# Patient Record
Sex: FEMALE | Race: OTHER | NOT HISPANIC OR LATINO | URBAN - METROPOLITAN AREA
[De-identification: names, ages, dates, MRNs, and addresses within clinical notes are randomized per-mention and may not be internally consistent; named-entity substitution may affect disease eponyms.]

---

## 2023-05-10 ENCOUNTER — INPATIENT (INPATIENT)
Facility: HOSPITAL | Age: 88
LOS: 5 days | Discharge: HOME | DRG: 310 | End: 2023-05-16
Attending: HOSPITALIST | Admitting: HOSPITALIST
Payer: MEDICAID

## 2023-05-10 VITALS
HEART RATE: 122 BPM | SYSTOLIC BLOOD PRESSURE: 137 MMHG | DIASTOLIC BLOOD PRESSURE: 95 MMHG | TEMPERATURE: 98 F | RESPIRATION RATE: 20 BRPM | WEIGHT: 160.06 LBS | OXYGEN SATURATION: 96 %

## 2023-05-10 DIAGNOSIS — E03.9 HYPOTHYROIDISM, UNSPECIFIED: ICD-10-CM

## 2023-05-10 DIAGNOSIS — F41.9 ANXIETY DISORDER, UNSPECIFIED: ICD-10-CM

## 2023-05-10 DIAGNOSIS — I48.91 UNSPECIFIED ATRIAL FIBRILLATION: ICD-10-CM

## 2023-05-10 DIAGNOSIS — Z29.9 ENCOUNTER FOR PROPHYLACTIC MEASURES, UNSPECIFIED: ICD-10-CM

## 2023-05-10 DIAGNOSIS — Z96.653 PRESENCE OF ARTIFICIAL KNEE JOINT, BILATERAL: Chronic | ICD-10-CM

## 2023-05-10 DIAGNOSIS — I50.9 HEART FAILURE, UNSPECIFIED: ICD-10-CM

## 2023-05-10 DIAGNOSIS — I87.2 VENOUS INSUFFICIENCY (CHRONIC) (PERIPHERAL): ICD-10-CM

## 2023-05-10 LAB
ALBUMIN SERPL ELPH-MCNC: 3.2 G/DL — LOW (ref 3.5–5)
ALP SERPL-CCNC: 90 U/L — SIGNIFICANT CHANGE UP (ref 40–120)
ALT FLD-CCNC: 23 U/L DA — SIGNIFICANT CHANGE UP (ref 10–60)
ANION GAP SERPL CALC-SCNC: 2 MMOL/L — LOW (ref 5–17)
APPEARANCE UR: CLEAR — SIGNIFICANT CHANGE UP
AST SERPL-CCNC: 20 U/L — SIGNIFICANT CHANGE UP (ref 10–40)
BASOPHILS # BLD AUTO: 0.06 K/UL — SIGNIFICANT CHANGE UP (ref 0–0.2)
BASOPHILS NFR BLD AUTO: 0.9 % — SIGNIFICANT CHANGE UP (ref 0–2)
BILIRUB SERPL-MCNC: 0.3 MG/DL — SIGNIFICANT CHANGE UP (ref 0.2–1.2)
BILIRUB UR-MCNC: NEGATIVE — SIGNIFICANT CHANGE UP
BUN SERPL-MCNC: 25 MG/DL — HIGH (ref 7–18)
CALCIUM SERPL-MCNC: 9.1 MG/DL — SIGNIFICANT CHANGE UP (ref 8.4–10.5)
CHLORIDE SERPL-SCNC: 112 MMOL/L — HIGH (ref 96–108)
CO2 SERPL-SCNC: 25 MMOL/L — SIGNIFICANT CHANGE UP (ref 22–31)
COLOR SPEC: YELLOW — SIGNIFICANT CHANGE UP
CREAT SERPL-MCNC: 0.83 MG/DL — SIGNIFICANT CHANGE UP (ref 0.5–1.3)
DIFF PNL FLD: ABNORMAL
EGFR: 69 ML/MIN/1.73M2 — SIGNIFICANT CHANGE UP
EOSINOPHIL # BLD AUTO: 0.04 K/UL — SIGNIFICANT CHANGE UP (ref 0–0.5)
EOSINOPHIL NFR BLD AUTO: 0.6 % — SIGNIFICANT CHANGE UP (ref 0–6)
GLUCOSE SERPL-MCNC: 96 MG/DL — SIGNIFICANT CHANGE UP (ref 70–99)
GLUCOSE UR QL: NEGATIVE — SIGNIFICANT CHANGE UP
HCT VFR BLD CALC: 37.5 % — SIGNIFICANT CHANGE UP (ref 34.5–45)
HGB BLD-MCNC: 11.8 G/DL — SIGNIFICANT CHANGE UP (ref 11.5–15.5)
IMM GRANULOCYTES NFR BLD AUTO: 0.4 % — SIGNIFICANT CHANGE UP (ref 0–0.9)
KETONES UR-MCNC: NEGATIVE — SIGNIFICANT CHANGE UP
LEUKOCYTE ESTERASE UR-ACNC: NEGATIVE — SIGNIFICANT CHANGE UP
LYMPHOCYTES # BLD AUTO: 1.76 K/UL — SIGNIFICANT CHANGE UP (ref 1–3.3)
LYMPHOCYTES # BLD AUTO: 26.2 % — SIGNIFICANT CHANGE UP (ref 13–44)
MAGNESIUM SERPL-MCNC: 2 MG/DL — SIGNIFICANT CHANGE UP (ref 1.6–2.6)
MCHC RBC-ENTMCNC: 28 PG — SIGNIFICANT CHANGE UP (ref 27–34)
MCHC RBC-ENTMCNC: 31.5 GM/DL — LOW (ref 32–36)
MCV RBC AUTO: 88.9 FL — SIGNIFICANT CHANGE UP (ref 80–100)
MONOCYTES # BLD AUTO: 0.62 K/UL — SIGNIFICANT CHANGE UP (ref 0–0.9)
MONOCYTES NFR BLD AUTO: 9.2 % — SIGNIFICANT CHANGE UP (ref 2–14)
NEUTROPHILS # BLD AUTO: 4.22 K/UL — SIGNIFICANT CHANGE UP (ref 1.8–7.4)
NEUTROPHILS NFR BLD AUTO: 62.7 % — SIGNIFICANT CHANGE UP (ref 43–77)
NITRITE UR-MCNC: NEGATIVE — SIGNIFICANT CHANGE UP
NRBC # BLD: 0 /100 WBCS — SIGNIFICANT CHANGE UP (ref 0–0)
NT-PROBNP SERPL-SCNC: 1070 PG/ML — HIGH (ref 0–450)
PH UR: 5 — SIGNIFICANT CHANGE UP (ref 5–8)
PLATELET # BLD AUTO: 281 K/UL — SIGNIFICANT CHANGE UP (ref 150–400)
POTASSIUM SERPL-MCNC: 3.8 MMOL/L — SIGNIFICANT CHANGE UP (ref 3.5–5.3)
POTASSIUM SERPL-SCNC: 3.8 MMOL/L — SIGNIFICANT CHANGE UP (ref 3.5–5.3)
PROT SERPL-MCNC: 7.1 G/DL — SIGNIFICANT CHANGE UP (ref 6–8.3)
PROT UR-MCNC: 15 MG/DL
RBC # BLD: 4.22 M/UL — SIGNIFICANT CHANGE UP (ref 3.8–5.2)
RBC # FLD: 16.8 % — HIGH (ref 10.3–14.5)
SODIUM SERPL-SCNC: 139 MMOL/L — SIGNIFICANT CHANGE UP (ref 135–145)
SP GR SPEC: 1.02 — SIGNIFICANT CHANGE UP (ref 1.01–1.02)
TROPONIN I, HIGH SENSITIVITY RESULT: 19.2 NG/L — SIGNIFICANT CHANGE UP
UROBILINOGEN FLD QL: NEGATIVE — SIGNIFICANT CHANGE UP
WBC # BLD: 6.73 K/UL — SIGNIFICANT CHANGE UP (ref 3.8–10.5)
WBC # FLD AUTO: 6.73 K/UL — SIGNIFICANT CHANGE UP (ref 3.8–10.5)

## 2023-05-10 PROCEDURE — 71046 X-RAY EXAM CHEST 2 VIEWS: CPT | Mod: 26

## 2023-05-10 PROCEDURE — 99285 EMERGENCY DEPT VISIT HI MDM: CPT

## 2023-05-10 PROCEDURE — 99254 IP/OBS CNSLTJ NEW/EST MOD 60: CPT

## 2023-05-10 PROCEDURE — 99222 1ST HOSP IP/OBS MODERATE 55: CPT | Mod: GC

## 2023-05-10 RX ORDER — PANTOPRAZOLE SODIUM 20 MG/1
40 TABLET, DELAYED RELEASE ORAL
Refills: 0 | Status: DISCONTINUED | OUTPATIENT
Start: 2023-05-10 | End: 2023-05-16

## 2023-05-10 RX ORDER — OXYCODONE AND ACETAMINOPHEN 5; 325 MG/1; MG/1
1 TABLET ORAL EVERY 6 HOURS
Refills: 0 | Status: DISCONTINUED | OUTPATIENT
Start: 2023-05-10 | End: 2023-05-16

## 2023-05-10 RX ORDER — LEVOTHYROXINE SODIUM 125 MCG
1 TABLET ORAL
Refills: 0 | DISCHARGE

## 2023-05-10 RX ORDER — FUROSEMIDE 40 MG
40 TABLET ORAL DAILY
Refills: 0 | Status: DISCONTINUED | OUTPATIENT
Start: 2023-05-10 | End: 2023-05-10

## 2023-05-10 RX ORDER — DIAZEPAM 5 MG
10 TABLET ORAL EVERY 12 HOURS
Refills: 0 | Status: DISCONTINUED | OUTPATIENT
Start: 2023-05-10 | End: 2023-05-16

## 2023-05-10 RX ORDER — METOPROLOL TARTRATE 50 MG
12.5 TABLET ORAL
Refills: 0 | Status: DISCONTINUED | OUTPATIENT
Start: 2023-05-10 | End: 2023-05-10

## 2023-05-10 RX ORDER — LEVOTHYROXINE SODIUM 125 MCG
50 TABLET ORAL DAILY
Refills: 0 | Status: DISCONTINUED | OUTPATIENT
Start: 2023-05-10 | End: 2023-05-16

## 2023-05-10 RX ORDER — WARFARIN SODIUM 2.5 MG/1
3 TABLET ORAL ONCE
Refills: 0 | Status: COMPLETED | OUTPATIENT
Start: 2023-05-10 | End: 2023-05-10

## 2023-05-10 RX ORDER — METOPROLOL TARTRATE 50 MG
12.5 TABLET ORAL EVERY 12 HOURS
Refills: 0 | Status: DISCONTINUED | OUTPATIENT
Start: 2023-05-10 | End: 2023-05-12

## 2023-05-10 RX ORDER — DORZOLAMIDE HYDROCHLORIDE TIMOLOL MALEATE 20; 5 MG/ML; MG/ML
1 SOLUTION/ DROPS OPHTHALMIC
Refills: 0 | DISCHARGE

## 2023-05-10 RX ORDER — DORZOLAMIDE HYDROCHLORIDE TIMOLOL MALEATE 20; 5 MG/ML; MG/ML
1 SOLUTION/ DROPS OPHTHALMIC EVERY 12 HOURS
Refills: 0 | Status: DISCONTINUED | OUTPATIENT
Start: 2023-05-10 | End: 2023-05-16

## 2023-05-10 RX ORDER — METOPROLOL TARTRATE 50 MG
5 TABLET ORAL ONCE
Refills: 0 | Status: COMPLETED | OUTPATIENT
Start: 2023-05-10 | End: 2023-05-10

## 2023-05-10 RX ORDER — ACETAMINOPHEN 500 MG
650 TABLET ORAL EVERY 6 HOURS
Refills: 0 | Status: DISCONTINUED | OUTPATIENT
Start: 2023-05-10 | End: 2023-05-16

## 2023-05-10 RX ORDER — OXYCODONE AND ACETAMINOPHEN 5; 325 MG/1; MG/1
0.5 TABLET ORAL
Refills: 0 | DISCHARGE

## 2023-05-10 RX ADMIN — Medication 5 MILLIGRAM(S): at 13:58

## 2023-05-10 RX ADMIN — Medication 12.5 MILLIGRAM(S): at 18:53

## 2023-05-10 RX ADMIN — Medication 650 MILLIGRAM(S): at 23:41

## 2023-05-10 RX ADMIN — WARFARIN SODIUM 3 MILLIGRAM(S): 2.5 TABLET ORAL at 21:41

## 2023-05-10 RX ADMIN — DORZOLAMIDE HYDROCHLORIDE TIMOLOL MALEATE 1 DROP(S): 20; 5 SOLUTION/ DROPS OPHTHALMIC at 19:40

## 2023-05-10 NOTE — H&P ADULT - NSHPPOAPULMEMBOLUS_GEN_A_CORE
4/6/21 5:42 pm spoke w/ father informed RVP and COVID not detected , child active and tolerating po fluids instructed to f/u w/ PMD and reviewed ED return precautions MPopcun PNP no 4/7 11:24am bcx no growth to date. Linda Sanz, DO

## 2023-05-10 NOTE — ED ADULT NURSE NOTE - NSFALLRISKFACTORS_ED_ALL_ED
Age: 85 years old or older/Bone Condition: Including osteoporosis, prolonged steroid use or metastatic bone disease/cancer/Other

## 2023-05-10 NOTE — H&P ADULT - PROBLEM SELECTOR PLAN 3
C/w home dose of levothyroxine 50mcg.   F/U TSH Warfarin. varicose veins and bilateral peripheral edema (non pitting).   C/w to monitor.

## 2023-05-10 NOTE — H&P ADULT - PROBLEM SELECTOR PLAN 4
Warfarin. Takes 10mg Diazepam 4 times a day PRN outpatient.   Started Diazepam 2 times a day PRN to prevent withdrawal.

## 2023-05-10 NOTE — ED PROVIDER NOTE - DATE/TIME OF ACCEPTANCE
Detail Level: Detailed
Quality 110: Preventive Care And Screening: Influenza Immunization: Influenza Immunization Ordered or Recommended, but not Administered due to system reason
Additional Notes: Advised to see local pharmacy for flu vaccination.
10-May-2023 10:58

## 2023-05-10 NOTE — CONSULT NOTE ADULT - ASSESSMENT
87 year old female from home, walks with cane at baseline, with hypothyroidism who presents with palpitations, shortness of breath, and mild chest pain. Cardiology consulted for new AFIB with RVR.

## 2023-05-10 NOTE — H&P ADULT - NSHPPHYSICALEXAM_GEN_ALL_CORE
PHYSICAL EXAM:  GENERAL: NAD, speaks in full sentences, no signs of respiratory distress  HEAD:  Atraumatic, Normocephalic  EYES: EOMI, PERRLA, conjunctiva and sclera clear  NECK: Supple,   CHEST/LUNG: bibasilar crackles on auscultation, no rhonchi or wheezing   HEART: irregular rate and tachycardia, no murmurs  ABDOMEN: Soft, Nontender, Nondistended; Bowel sounds present; No guarding  EXTREMITIES:  2+ Peripheral Pulses, bilateral 1+ edema noted.   PSYCH: AAOx3  NEUROLOGY: non-focal  SKIN: No rashes or lesions PHYSICAL EXAM:  GENERAL: NAD, speaks in full sentences, no signs of respiratory distress  HEAD:  Atraumatic, Normocephalic  EYES: EOMI, PERRLA, conjunctiva and sclera clear  NECK: Supple,   CHEST/LUNG: bibasilar crackles on auscultation, no rhonchi or wheezing   HEART: irregular rate and tachycardia, no murmurs  ABDOMEN: Soft, Nontender, Nondistended; Bowel sounds present; No guarding  EXTREMITIES:  2+ Peripheral Pulses, bilateral non pitting 1+ edema noted.   PSYCH: AAOx3  NEUROLOGY: non-focal  SKIN: No rashes or lesions

## 2023-05-10 NOTE — H&P ADULT - HISTORY OF PRESENT ILLNESS
Patient is a 86 y/o Patient is a 86 y/o who ambulates with a cane with PMH of chronic back pain, hypothyroidism and 'fast racing heart beat' who presented with complain of palpitations and shortness of breath. Patient reports that the she has had these symptoms before however the symptoms were very severe this morning. Patient also reports that she has not been taking her metoprolol which was prescribed for 'fast heart beat' but since she had chest discomfort and palpitations today she took multiple doses today and 'felt' that her blood pressure went too low. Patient denies any chest pain associated with the episode of palpitations. Patient also reports that she has had shortness of breath independent of palpitations as well. Patient reports that at times when she is lying down and moves in bed she can develop shortness of breath and has to rest before she feels better. Patient also reports that she develops shortness of breath when lying down at times, and uses 2 pillows to sleep. Patient also says at times she wakes up from sleeping feeling short of breath and she has change her position to be able to sleep after that. She also reports that she gets short of breath with exertion. Patient denies onset of chest pain with exertion. Patient also reports she has never had an MI, stent placement, echocardiogram, stress test. Patient does not follow with a cardiologist due to lack of insurance. Patient denies any recent history of fevers, chills, headache, lightheadedness, dizziness, chest pain, cough, nausea, vomiting, abdominal pain, diarrhea, constipation or urinary symptoms.

## 2023-05-10 NOTE — H&P ADULT - NSHPSOCIALHISTORY_GEN_ALL_CORE
Patient denies smoking, alcohol use or drug use.   Patient lives at with a friend and does not have a HHA.

## 2023-05-10 NOTE — ED PROVIDER NOTE - OBJECTIVE STATEMENT
87 year old female PMH hypothyroid coming in with cp, palpitations, and worsening LE swelling. medical history is limited as the pts family that used to help take care of her passed away. Pts family member states only medication the patient is taking is for her thyroid. Friend of the patient gave her a dose of a water pill for the patients LE swelling and the patient took one dose although unsure of the name of that medication.

## 2023-05-10 NOTE — CONSULT NOTE ADULT - PROBLEM SELECTOR RECOMMENDATION 9
Pt with intermittent palpitation, shortness of breath and chest pain over the past few years  Currently rate controlled and asymptomatic  EKG in ED: AFib RVR, s/p 5mg IV lopressor in ED  Average tele   CHADSVASc 3 (+2 age, +1 gender)  - Start low dose metoprolol for further rate control  - Start Warfarin as patient reports having no insurance  - Monitor daily INR (Goal INR 2-3)  - Pt can be discharged before reaching therapeutic levels as long as she has appropriate primary care follow up  - Obtain TTE for possible tachycardia induced myopathy and valvular vs no-valvular Afib dx  - Obtain Thyroid function tests  - Continue with TELE monitoring  - Consider SW consult for insurance planning Pt with intermittent palpitation, shortness of breath and chest pain over the past few years  Pt mentions having taken metoprolol previously at a higher dose which caused her BP to drop  Currently rate controlled and asymptomatic, s/p 5mg IV lopressor in ED  EKG in ED: AFib RVR  Troponin Negative x1  Average HR on Tele 105  CHADSVASc 3 (+2 age, +1 gender)  - Start low dose metoprolol for further rate control  - Start Warfarin as patient reports having no insurance  - Monitor daily INR (Goal INR 2-3)  - Pt can be discharged before reaching therapeutic levels as long as she has appropriate primary care follow up  - Obtain TTE for possible tachycardia induced myopathy and valvular vs non-valvular Afib dx  - Obtain Thyroid function tests  - Continue with TELE monitoring x24hrs  - Consider  consult for insurance planning

## 2023-05-10 NOTE — H&P ADULT - PROBLEM SELECTOR PLAN 2
Start Metoprolol 12.5 BID for rate control.  CHADSVASC: 3 (age, female).   Needs AC.   Will start warfarin (as recommended by cardiology due to lack of insurance) C/w home dose of levothyroxine 50mcg.   F/U TSH

## 2023-05-10 NOTE — ED PROVIDER NOTE - PROGRESS NOTE DETAILS
ecg afib rvr. labs with elevated bnp. cxt with hiatal hernia. [id number] ordered for afib. will admit.

## 2023-05-10 NOTE — ED PROVIDER NOTE - NS ED MD DISPO DIVISION
Subjective   Patient ID: Matthew is a 27 month old male who is accompanied by:mother and father    Well Child Assessment:    Elimination  Elimination problems do not include constipation or diarrhea.   Sleep  There are no sleep problems.       Here for well child visit  History of premature twin gestation at 27 weeks.  Good weight gain since birth. Eats variety of solids  Gross and fine motor development normal but parents concerned about speech delay.  Child says only a couple of words.      Additional concerns today: None     Review of Systems   Constitutional: Negative for activity change, appetite change, chills, fatigue, fever, irritability and unexpected weight change.   HENT: Negative for congestion, dental problem, drooling, ear discharge, ear pain, hearing loss, mouth sores, nosebleeds, rhinorrhea, sneezing, sore throat and trouble swallowing.    Eyes: Negative for discharge, redness and itching.   Respiratory: Negative for cough, wheezing and stridor.    Cardiovascular: Negative for cyanosis.   Gastrointestinal: Negative for abdominal distention, abdominal pain, blood in stool, constipation, diarrhea, nausea and vomiting.   Endocrine: Negative for polydipsia, polyphagia and polyuria.   Genitourinary: Negative for decreased urine volume, dysuria, enuresis and frequency.   Musculoskeletal: Negative for arthralgias, gait problem, joint swelling and neck stiffness.   Skin: Negative for pallor and rash.   Allergic/Immunologic: Negative for environmental allergies and food allergies.   Neurological: Negative for tremors, seizures, speech difficulty, weakness and headaches.   Hematological: Negative for adenopathy. Does not bruise/bleed easily.   Psychiatric/Behavioral: Negative for behavioral problems and sleep disturbance. The patient is not hyperactive.        Patient's medications, allergies, past medical, surgical, social and family histories were reviewed and updated as appropriate.    Objective   Vitals:  Temp 98.6 °F (37 °C)   Ht 2' 11.83\" (0.91 m)   Wt 13.9 kg (30 lb 11.4 oz)   HC 49 cm (19.29\")   BMI 16.82 kg/m²   BSA 0.58 m²   Growth parameters are noted and are appropriate for age.    Physical Exam  Constitutional:       General: He is active.      Appearance: Normal appearance. He is well-developed.   HENT:      Head: Normocephalic.      Right Ear: Tympanic membrane, ear canal and external ear normal.      Left Ear: Tympanic membrane, ear canal and external ear normal.      Nose: Nose normal.      Mouth/Throat:      Mouth: Mucous membranes are moist.      Pharynx: Oropharynx is clear. No oropharyngeal exudate or posterior oropharyngeal erythema.   Eyes:      General: Red reflex is present bilaterally.         Right eye: No discharge.         Left eye: No discharge.      Extraocular Movements: Extraocular movements intact.      Conjunctiva/sclera: Conjunctivae normal.      Pupils: Pupils are equal, round, and reactive to light.   Neck:      Musculoskeletal: Normal range of motion and neck supple.   Cardiovascular:      Rate and Rhythm: Normal rate and regular rhythm.      Pulses: Normal pulses.      Heart sounds: Normal heart sounds. No murmur.   Pulmonary:      Effort: Pulmonary effort is normal. No retractions.      Breath sounds: Normal breath sounds. No stridor. No wheezing, rhonchi or rales.   Abdominal:      General: Abdomen is flat. Bowel sounds are normal. There is no distension.      Palpations: There is no mass.      Tenderness: There is no abdominal tenderness. There is no guarding or rebound.      Hernia: A hernia is present.      Comments: Reducible umbilical hernia   Genitourinary:     Penis: Normal.       Scrotum/Testes: Normal.   Musculoskeletal: Normal range of motion.         General: No swelling, tenderness, deformity or signs of injury.   Lymphadenopathy:      Cervical: No cervical adenopathy.   Skin:     General: Skin is warm.      Capillary Refill: Capillary refill takes less than 2  seconds.      Findings: No rash.   Neurological:      General: No focal deficit present.      Mental Status: He is alert and oriented for age.      Cranial Nerves: No cranial nerve deficit.      Coordination: Coordination normal.      Gait: Gait normal.      Deep Tendon Reflexes: Reflexes normal.          Assessment   Problem List Items Addressed This Visit     None      Visit Diagnoses     Encounter for routine child health examination without abnormal findings    -  Primary    Speech delay        Umbilical hernia without obstruction and without gangrene        Need for vaccination        Relevant Orders    DTAP VACC, IM <7YR OF AGE(INFANRIX) (Completed)    PNEUMOCOCCAL CONJUGATE 13 VALENT VACC(PREVNAR-13) (Completed)    HIB VACC,PRP-OMP,IM(PEDVAXHIB) (Completed)    HEPATITIS A VACCINE PEDIATRIC / ADOLESCENT 2 DOSE IM (Completed)                Primary water source has adequate fluoride: Yes  Developmental milestones were reviewed  and speech delay noted    Counseling  Nutrition/Weight Management:  Assessment and discussion of current Nutrition behaviors performed:  Yes  Assessment and discussion of current Physical Activity behaviors performed: Yes     Immunizations: per orders.  History of previous adverse reactions to immunizations? no  Immunizations given today: Yes - Immunizations given today and vaccine counseling including benefits, risks, and adverse reactions were provided by myself during the visit.    Follow-up for the 30 month well child visit, or sooner as needed.   [facility]

## 2023-05-10 NOTE — H&P ADULT - PROBLEM SELECTOR PLAN 1
P/w shortness of breath, palpitations, b/l lung crackles.   - Pro-bnp: 1070  - EKG: Atrial fibrillation with RVR.   - Troponin: 19.2.   - Start Lasix IV 40mg OD.   - Strict Is and Os.   - Daily weight.   - F/U echocardiogram.   - Cardiology consulted - Dr Rivera. P/w shortness of breath, palpitations, b/l lung crackles.   - Pro-bnp: 1070 (normal limit for age)  - EKG: Atrial fibrillation with RVR.   - Troponin: 19.2.   - Start Metoprolol 12.5 BID for rate control.  - CHADSVASC: 3 (age, female).   - Needs AC.   - Will start warfarin (as recommended by cardiology due to lack of insurance)  - F/U echocardiogram.   - Admit to telemetry.  - Cardiology consulted - Dr Rivera.

## 2023-05-10 NOTE — H&P ADULT - ASSESSMENT
Patient is a 88 y/o who ambulates with a cane with PMH of chronic back pain, hypothyroidism and 'fast racing heart beat' who presented with complain of palpitations and shortness of breath. Patient is being admitted for further management of Atrial fibrillation with RVR and Acute CHF.  Patient is a 86 y/o who ambulates with a cane with PMH of chronic back pain, hypothyroidism and 'fast racing heart beat' who presented with complain of palpitations and shortness of breath. Patient is being admitted for further management of Atrial fibrillation with RVR .

## 2023-05-10 NOTE — ED ADULT NURSE NOTE - OBJECTIVE STATEMENT
pt is here for medical evaluation.  As per family member, pt c/o "running too fast her heart", c/o "not feeling well", denied sob or fever, walking with a cane, a/ox4, following directions,

## 2023-05-10 NOTE — ED ADULT NURSE NOTE - NSFALLHARMRISKINTERV_ED_ALL_ED

## 2023-05-10 NOTE — CONSULT NOTE ADULT - ATTENDING COMMENTS
87 F presenting with palpitations in setting of new-onset A fib. Rate controlled on telemetry, currently without symptoms.     1. A fib: Ideally would recommend Eliquis, but patient has no insurance so should be started on warfarin. Echo and TSH pending. Low dose metoprolol and OP follow-up as per above.    Of note, patient without clinical concern for heart failure at this time.    D/W medicine team attending Dr. Young.

## 2023-05-10 NOTE — PATIENT PROFILE ADULT - FALL HARM RISK - HARM RISK INTERVENTIONS

## 2023-05-10 NOTE — H&P ADULT - ATTENDING COMMENTS
This is an 87 year old female with PMH of chronic back pain, hypothyroidism presenting to the ED with complaint of palpitations and SOB. Found to have afib with RVR. Patient states she took metoprolol for 'fast heart beat' after which her blood pressure was low and so her doctor asked her to go to the hospital. Spoke to patient via Ukranian  along with PGY-1 Dr. Davis.   Will admit patient to telemetry.   Start on low dose metoprolol for rate control.  Cardiology consult. This is an 87 year old female with PMH of chronic back pain, hypothyroidism presenting to the ED with complaint of palpitations and SOB. Found to have afib with RVR. Patient states she took metoprolol for 'fast heart beat' after which her blood pressure was low and so her doctor asked her to go to the hospital. Spoke to patient via Ukranian  along with PGY-1 Dr. Davis.   Will admit patient to telemetry.   Start on low dose metoprolol for rate control.  Cardiology consult.  Start on anticoagulation. Since patient has no insurance, start coumadin. Monitor INR and dose coumadin.  Obtain echocardiogram.

## 2023-05-10 NOTE — CONSULT NOTE ADULT - SUBJECTIVE AND OBJECTIVE BOX
CHIEF COMPLAINT: Palpitations    HPI: 87 year old female from home, walks with cane at baseline, with hypothyroidism who presents with palpitations and shortness of breath. Pt states she has had these symptoms for over a year however the shortness of breath has been worsening. Pt went to her PMD 2 weeks ago who told her she has an arrythmia and that she should go to the hospital for further workup. Pt does not speak english, so she waited until her friend was available to come to the hospital. Pt states she has some associated pressure like, 3/10, intermittent, non radiating substernal chest pain. Pt takes Validol at home which helps with the chest pain. Pain worsens with activity. Pt denies any dizziness, headache, N/V/D, numbness or tingling.     : Mimi #[id number]    PAST MEDICAL & SURGICAL HISTORY:    Hypothyroidism    Allergies: NKDA    Intolerances        MEDICATIONS  (STANDING):  furosemide   Injectable 40 milliGRAM(s) IV Push daily  metoprolol tartrate 12.5 milliGRAM(s) Oral two times a day    MEDICATIONS  (PRN):      FAMILY HISTORY:    No family history of premature coronary artery disease or sudden cardiac death    SOCIAL HISTORY:  Smoking- Denies  Alcohol- Stopped 10 years ago  Illicit Drug use- Denies    REVIEW OF SYSTEMS:  CONSTITUTIONAL: No fever, weight loss, or fatigue  RESPIRATORY: No cough, wheezing, chills or hemoptysis; + shortness of breath  CARDIOVASCULAR: + chest pain, palpitations, dizziness, or leg swelling  GASTROINTESTINAL: No abdominal pain. No nausea, vomiting, or hematemesis; No diarrhea or constipation. No melena or hematochezia.  GENITOURINARY: No dysuria or hematuria, urinary frequency  NEUROLOGICAL: No headaches, memory loss, loss of strength, numbness, or tremors  ENDOCRINE: No polyuria, polydipsia, or heat/cold intolerance  MUSCULOSKELETAL: No muscle aches, joint pains  HEME: no easy bruisability, no tender or enlarged lymph nodes  SKIN: No itching, burning, rashes, or lesions    [ x] All others negative	  [ ] Unable to obtain    Vital Signs Last 24 Hrs  T(C): 36.8 (10 May 2023 15:55), Max: 36.8 (10 May 2023 15:55)  T(F): 98.3 (10 May 2023 15:55), Max: 98.3 (10 May 2023 15:55)  HR: 107 (10 May 2023 15:55) (97 - 130)  BP: 126/81 (10 May 2023 15:55) (121/87 - 137/95)  BP(mean): --  RR: 18 (10 May 2023 15:55) (16 - 20)  SpO2: 96% (10 May 2023 15:55) (96% - 98%)    Parameters below as of 10 May 2023 15:55  Patient On (Oxygen Delivery Method): room air      I&O's Summary      PHYSICAL EXAM:  General: No acute distress, elderly female laying in bed, well groomed  HEENT: EOMI, PERRL  Neck: Supple, No JVD  Lungs: Clear to auscultation bilaterally; No rales or wheezing  Heart: Irregular rate and rhythm; No murmurs, rubs, or gallops  Abdomen: Nontender, bowel sounds present  Extremities: No clubbing or cyanosis. 1+ Pitting edema bilateral lower extremity  Nervous system:  Alert & Oriented X3, no focal deficits  Psychiatric: Normal affect  Skin: No rashes or lesions      LABS:  05-10    139  |  112<H>  |  25<H>  ----------------------------<  96  3.8   |  25  |  0.83    Ca    9.1      10 May 2023 12:20  Mg     2.0     05-10    TPro  7.1  /  Alb  3.2<L>  /  TBili  0.3  /  DBili  x   /  AST  20  /  ALT  23  /  AlkPhos  90  05-10    Creatinine Trend: 0.83<--                        11.8   6.73  )-----------( 281      ( 10 May 2023 12:20 )             37.5         Lipid Panel: Pending  Cardiac Enzymes: Troponin 18.9          RADIOLOGY: CXR: Clear, moderate hiatal hernia    ECG [my interpretation]: Afib RVR    TELEMETRY: Average  in past 3 hours    ECHO: Pending    STRESS TEST: N/A    CATHETERIZATION: N/A CHIEF COMPLAINT: Palpitations    HPI: 87 year old female from home, walks with cane at baseline, with hypothyroidism who presents with palpitations and shortness of breath. Pt states she has had these symptoms for over a year however the shortness of breath has been worsening. Pt went to her PMD 2 weeks ago who told her she has an arrythmia and that she should go to the hospital for further workup. Pt does not speak english, so she waited until her friend was available to come to the hospital. Pt states she has associated pressure like, 3/10, intermittent, non radiating, substernal chest pain. Pt takes Validol at home which helps with the chest pain. Pain worsens with activity. Pt denies any dizziness, headache, N/V/D, numbness or tingling.     : Mimi #[id number]    PAST MEDICAL & SURGICAL HISTORY:    Hypothyroidism    Allergies: NKDA    Intolerances        MEDICATIONS  (STANDING):  furosemide   Injectable 40 milliGRAM(s) IV Push daily  metoprolol tartrate 12.5 milliGRAM(s) Oral two times a day    MEDICATIONS  (PRN):      FAMILY HISTORY:    No family history of premature coronary artery disease or sudden cardiac death    SOCIAL HISTORY:  Smoking- Denies  Alcohol- Stopped 10 years ago  Illicit Drug use- Denies    REVIEW OF SYSTEMS:  CONSTITUTIONAL: No fever, weight loss, or fatigue  RESPIRATORY: No cough, wheezing, chills or hemoptysis; + shortness of breath  CARDIOVASCULAR: + chest pain, palpitations, dizziness, or leg swelling  GASTROINTESTINAL: No abdominal pain. No nausea, vomiting, or hematemesis; No diarrhea or constipation. No melena or hematochezia.  GENITOURINARY: No dysuria or hematuria, urinary frequency  NEUROLOGICAL: No headaches, memory loss, loss of strength, numbness, or tremors  ENDOCRINE: No polyuria, polydipsia, or heat/cold intolerance  MUSCULOSKELETAL: No muscle aches, joint pains  HEME: no easy bruisability, no tender or enlarged lymph nodes  SKIN: No itching, burning, rashes, or lesions    [ x] All others negative	  [ ] Unable to obtain    Vital Signs Last 24 Hrs  T(C): 36.8 (10 May 2023 15:55), Max: 36.8 (10 May 2023 15:55)  T(F): 98.3 (10 May 2023 15:55), Max: 98.3 (10 May 2023 15:55)  HR: 107 (10 May 2023 15:55) (97 - 130)  BP: 126/81 (10 May 2023 15:55) (121/87 - 137/95)  BP(mean): --  RR: 18 (10 May 2023 15:55) (16 - 20)  SpO2: 96% (10 May 2023 15:55) (96% - 98%)    Parameters below as of 10 May 2023 15:55  Patient On (Oxygen Delivery Method): room air      I&O's Summary      PHYSICAL EXAM:  General: No acute distress, elderly female laying in bed, well groomed  HEENT: EOMI, PERRL  Neck: Supple, No JVD  Lungs: Clear to auscultation bilaterally; No rales or wheezing  Heart: Irregular rate and rhythm; No murmurs, rubs, or gallops  Abdomen: Nontender, bowel sounds present  Extremities: No clubbing or cyanosis. 1+ Pitting edema bilateral lower extremity  Nervous system:  Alert & Oriented X3, no focal deficits  Psychiatric: Normal affect  Skin: No rashes or lesions      LABS:  05-10    139  |  112<H>  |  25<H>  ----------------------------<  96  3.8   |  25  |  0.83    Ca    9.1      10 May 2023 12:20  Mg     2.0     05-10    TPro  7.1  /  Alb  3.2<L>  /  TBili  0.3  /  DBili  x   /  AST  20  /  ALT  23  /  AlkPhos  90  05-10    Creatinine Trend: 0.83<--                        11.8   6.73  )-----------( 281      ( 10 May 2023 12:20 )             37.5         Lipid Panel: Pending  Cardiac Enzymes: Troponin 18.9          RADIOLOGY: CXR: Clear, moderate hiatal hernia    ECG [my interpretation]: Afib RVR    TELEMETRY: Average  in past 3 hours    ECHO: Pending    STRESS TEST: N/A    CATHETERIZATION: N/A

## 2023-05-11 LAB
A1C WITH ESTIMATED AVERAGE GLUCOSE RESULT: 6.2 % — HIGH (ref 4–5.6)
ANION GAP SERPL CALC-SCNC: 4 MMOL/L — LOW (ref 5–17)
APTT BLD: 27.1 SEC — LOW (ref 27.5–35.5)
BUN SERPL-MCNC: 21 MG/DL — HIGH (ref 7–18)
CALCIUM SERPL-MCNC: 8.8 MG/DL — SIGNIFICANT CHANGE UP (ref 8.4–10.5)
CHLORIDE SERPL-SCNC: 112 MMOL/L — HIGH (ref 96–108)
CHOLEST SERPL-MCNC: 215 MG/DL — HIGH
CO2 SERPL-SCNC: 25 MMOL/L — SIGNIFICANT CHANGE UP (ref 22–31)
CREAT SERPL-MCNC: 0.86 MG/DL — SIGNIFICANT CHANGE UP (ref 0.5–1.3)
CULTURE RESULTS: SIGNIFICANT CHANGE UP
EGFR: 66 ML/MIN/1.73M2 — SIGNIFICANT CHANGE UP
ESTIMATED AVERAGE GLUCOSE: 131 MG/DL — HIGH (ref 68–114)
GLUCOSE SERPL-MCNC: 87 MG/DL — SIGNIFICANT CHANGE UP (ref 70–99)
HCT VFR BLD CALC: 40.6 % — SIGNIFICANT CHANGE UP (ref 34.5–45)
HDLC SERPL-MCNC: 75 MG/DL — SIGNIFICANT CHANGE UP
HGB BLD-MCNC: 12.4 G/DL — SIGNIFICANT CHANGE UP (ref 11.5–15.5)
INR BLD: 0.98 RATIO — SIGNIFICANT CHANGE UP (ref 0.88–1.16)
LIPID PNL WITH DIRECT LDL SERPL: 121 MG/DL — HIGH
MAGNESIUM SERPL-MCNC: 2.2 MG/DL — SIGNIFICANT CHANGE UP (ref 1.6–2.6)
MCHC RBC-ENTMCNC: 27.5 PG — SIGNIFICANT CHANGE UP (ref 27–34)
MCHC RBC-ENTMCNC: 30.5 GM/DL — LOW (ref 32–36)
MCV RBC AUTO: 90 FL — SIGNIFICANT CHANGE UP (ref 80–100)
NON HDL CHOLESTEROL: 140 MG/DL — HIGH
NRBC # BLD: 0 /100 WBCS — SIGNIFICANT CHANGE UP (ref 0–0)
PHOSPHATE SERPL-MCNC: 3.2 MG/DL — SIGNIFICANT CHANGE UP (ref 2.5–4.5)
PLATELET # BLD AUTO: 269 K/UL — SIGNIFICANT CHANGE UP (ref 150–400)
POTASSIUM SERPL-MCNC: 4.4 MMOL/L — SIGNIFICANT CHANGE UP (ref 3.5–5.3)
POTASSIUM SERPL-SCNC: 4.4 MMOL/L — SIGNIFICANT CHANGE UP (ref 3.5–5.3)
PROTHROM AB SERPL-ACNC: 11.7 SEC — SIGNIFICANT CHANGE UP (ref 10.5–13.4)
RBC # BLD: 4.51 M/UL — SIGNIFICANT CHANGE UP (ref 3.8–5.2)
RBC # FLD: 16.9 % — HIGH (ref 10.3–14.5)
SODIUM SERPL-SCNC: 141 MMOL/L — SIGNIFICANT CHANGE UP (ref 135–145)
SPECIMEN SOURCE: SIGNIFICANT CHANGE UP
TRIGL SERPL-MCNC: 94 MG/DL — SIGNIFICANT CHANGE UP
TSH SERPL-MCNC: 1.65 UU/ML — SIGNIFICANT CHANGE UP (ref 0.34–4.82)
WBC # BLD: 4.69 K/UL — SIGNIFICANT CHANGE UP (ref 3.8–10.5)
WBC # FLD AUTO: 4.69 K/UL — SIGNIFICANT CHANGE UP (ref 3.8–10.5)

## 2023-05-11 PROCEDURE — 93306 TTE W/DOPPLER COMPLETE: CPT | Mod: 26

## 2023-05-11 PROCEDURE — 99232 SBSQ HOSP IP/OBS MODERATE 35: CPT

## 2023-05-11 RX ORDER — WARFARIN SODIUM 2.5 MG/1
2.5 TABLET ORAL ONCE
Refills: 0 | Status: COMPLETED | OUTPATIENT
Start: 2023-05-11 | End: 2023-05-11

## 2023-05-11 RX ADMIN — Medication 50 MICROGRAM(S): at 05:47

## 2023-05-11 RX ADMIN — PANTOPRAZOLE SODIUM 40 MILLIGRAM(S): 20 TABLET, DELAYED RELEASE ORAL at 05:47

## 2023-05-11 RX ADMIN — DORZOLAMIDE HYDROCHLORIDE TIMOLOL MALEATE 1 DROP(S): 20; 5 SOLUTION/ DROPS OPHTHALMIC at 05:47

## 2023-05-11 RX ADMIN — Medication 650 MILLIGRAM(S): at 00:25

## 2023-05-11 RX ADMIN — Medication 12.5 MILLIGRAM(S): at 17:04

## 2023-05-11 RX ADMIN — DORZOLAMIDE HYDROCHLORIDE TIMOLOL MALEATE 1 DROP(S): 20; 5 SOLUTION/ DROPS OPHTHALMIC at 17:04

## 2023-05-11 RX ADMIN — Medication 12.5 MILLIGRAM(S): at 05:47

## 2023-05-11 RX ADMIN — WARFARIN SODIUM 2.5 MILLIGRAM(S): 2.5 TABLET ORAL at 21:23

## 2023-05-11 NOTE — PROGRESS NOTE ADULT - SUBJECTIVE AND OBJECTIVE BOX
PGY-1 Progress Note discussed with attending    PAGER #: [[phone]] TILL 5:00 PM  PLEASE CONTACT ON CALL TEAM:  - On Call Team (Please refer to Lashawn) FROM 5:00 PM - 8:30PM  - Nightfloat Team FROM 8:30 -7:30 AM    CC: Patient is a 87y old  Female who presents with a chief complaint of Afib RVR (10 May 2023 16:00)      OVERNIGHT EVENTS:    SUBJECTIVE / INTERVAL HPI: Patient seen and examined at bedside. Reports some chest pain specifically at night worse when lying down. Says that these symptoms also have occurred at home but recently symptoms have worsened. Endorses chronic constipation for which she says she manages at home by drinking green tea every day. Advised patient to limit consumption of green tea given new diagnosis of afib with RVR. Denies, headache, shortness of breath, abdominal pain, nausea, vomiting, diarrhea, and dysuria.     ROS:  CONSTITUTIONAL: No weakness, fevers or chills  EYES/ENT: No visual changes;  No vertigo or throat pain   NECK: No pain or stiffness  RESPIRATORY: No cough, wheezing, hemoptysis; No shortness of breath  CARDIOVASCULAR: +chest pain, + palpitations  GASTROINTESTINAL: No abdominal or epigastric pain. No nausea, vomiting, or hematemesis; No diarrhea or constipation. No melena or hematochezia.  GENITOURINARY: No dysuria, frequency or hematuria  NEUROLOGICAL: No numbness or weakness  SKIN: No itching, burning, rashes, or lesions     VITAL SIGNS:  Vital Signs Last 24 Hrs  T(C): 37 (11 May 2023 10:55), Max: 37 (11 May 2023 10:55)  T(F): 98.6 (11 May 2023 10:55), Max: 98.6 (11 May 2023 10:55)  HR: 107 (11 May 2023 10:55) (78 - 109)  BP: 125/88 (11 May 2023 10:55) (106/81 - 146/96)  BP(mean): --  RR: 19 (11 May 2023 10:55) (18 - 19)  SpO2: 97% (11 May 2023 10:55) (96% - 98%)    Parameters below as of 11 May 2023 10:55  Patient On (Oxygen Delivery Method): room air        PHYSICAL EXAM:    General: WDWN  HEENT: NC/AT; PERRL, anicteric sclera; MMM  Neck: supple  Cardiovascular: tachycardic, +S1/S2; irregularly irregular  Respiratory: CTA B/L; no W/R/R  Gastrointestinal: soft, NT/ND; +BSx4  Extremities: WWP; no edema, clubbing or cyanosis  Vascular: 2+ radial, DP/PT pulses B/L  Skin: Warm, dry, good turgor, no rashes, or ecchymoses  Neurological: AAOx3; no focal deficits    MEDICATIONS:  MEDICATIONS  (STANDING):  dorzolamide 2%/timolol 0.5% Ophthalmic Solution 1 Drop(s) Both EYES every 12 hours  levothyroxine 50 MICROGram(s) Oral daily  metoprolol tartrate 12.5 milliGRAM(s) Oral every 12 hours  pantoprazole    Tablet 40 milliGRAM(s) Oral before breakfast  warfarin 2.5 milliGRAM(s) Oral once    MEDICATIONS  (PRN):  acetaminophen     Tablet .. 650 milliGRAM(s) Oral every 6 hours PRN Temp greater or equal to 38C (100.4F), Mild Pain (1 - 3)  diazepam    Tablet 10 milliGRAM(s) Oral every 12 hours PRN for anxiety  oxycodone    5 mG/acetaminophen 325 mG 1 Tablet(s) Oral every 6 hours PRN Moderate Pain (4 - 6)      ALLERGIES:  Allergies    Allergy Status Unknown    Intolerances        LABS:                        12.4   4.69  )-----------( 269      ( 11 May 2023 08:06 )             40.6     05-11    141  |  112<H>  |  21<H>  ----------------------------<  87  4.4   |  25  |  0.86    Ca    8.8      11 May 2023 08:06  Phos  3.2     05-11  Mg     2.2     05-11    TPro  7.1  /  Alb  3.2<L>  /  TBili  0.3  /  DBili  x   /  AST  20  /  ALT  23  /  AlkPhos  90  05-10    PT/INR - ( 11 May 2023 08:06 )   PT: 11.7 sec;   INR: 0.98 ratio         PTT - ( 11 May 2023 08:06 )  PTT:27.1 sec  Urinalysis Basic - ( 10 May 2023 13:20 )    Color: Yellow / Appearance: Clear / S.025 / pH: x  Gluc: x / Ketone: Negative  / Bili: Negative / Urobili: Negative   Blood: x / Protein: 15 mg/dL / Nitrite: Negative   Leuk Esterase: Negative / RBC: 2-5 /HPF / WBC 0-2 /HPF   Sq Epi: x / Non Sq Epi: x / Bacteria: Moderate /HPF      CAPILLARY BLOOD GLUCOSE          RADIOLOGY & ADDITIONAL TESTS:   < from: Xray Chest 2 Views PA/Lat (05.10.23 @ 12:00) >    INTERPRETATION:  PA and lateral chest on May 10, 2023 at 11:51 AM.   Patient has chest pain and is short of breath.    COMPARISON: None available.    Heart grossly normal. There is a moderate hiatal hernia.    Lungs are clear.    IMPRESSION: Moderate hiatal hernia.    < end of copied text >

## 2023-05-11 NOTE — PROGRESS NOTE ADULT - PROBLEM SELECTOR PLAN 1
P/w shortness of breath, palpitations, b/l lung crackles.   - Pro-bnp: 1070 (normal limit for age)  - EKG: Atrial fibrillation with RVR.   - Troponin: 19.2.   - Start Metoprolol 12.5 BID for rate control.  - CHADSVASC: 3 (age, female).   - Needs AC.   - started on warfarin (as recommended by cardiology due to lack of insurance)  - F/U echocardiogram.   - c/w telemetry.  - Cardiology consulted - Dr Rivera.  - Social work consult (no insurance)

## 2023-05-11 NOTE — CHART NOTE - NSCHARTNOTEFT_GEN_A_CORE
Talked to patient using  regarding outpatient doctors. She has a friend doctor that she calls whenever she needs something. Dr. Wallace on [facility]. she gave me a number [phone]. Dr. Wallace seems to be a PM&R doctor, and the number she gave seems to be for a family medicine clinic.     Dr. Young called the number in question, and staff stated that the patient does in fact see a doctor there (unclear of the name), but they had left by the time of the call. Call back number for the floor was left.    Will follow up with the office tomorrow to see if they can see the patient and monitor her INR in the outpatient setting.

## 2023-05-11 NOTE — PROGRESS NOTE ADULT - ATTENDING COMMENTS
Patient admitted with afib with RVR. Seen and examined at bedside. States her palpitations are improved. HR much better on low dose metoprolol. Patient received 3mg coumadin yesterday, INR 0.98.   Patient does not have insurance and so plan was to discharge her on coumadin. Patient has a doctor in [place] [phone] however, seems like he is an old friend and only does tele-visits mostly. Patient will not be able to follow up on her INR with him. Will dose coumadin and follow INR until therapeutic. SW gave patient followup at [facility]. Echocardiogram performed-results pending. PT consult.

## 2023-05-12 ENCOUNTER — TRANSCRIPTION ENCOUNTER (OUTPATIENT)
Age: 88
End: 2023-05-12

## 2023-05-12 LAB
ANION GAP SERPL CALC-SCNC: 4 MMOL/L — LOW (ref 5–17)
APTT BLD: 27.9 SEC — SIGNIFICANT CHANGE UP (ref 27.5–35.5)
BUN SERPL-MCNC: 22 MG/DL — HIGH (ref 7–18)
CALCIUM SERPL-MCNC: 9.3 MG/DL — SIGNIFICANT CHANGE UP (ref 8.4–10.5)
CHLORIDE SERPL-SCNC: 111 MMOL/L — HIGH (ref 96–108)
CO2 SERPL-SCNC: 27 MMOL/L — SIGNIFICANT CHANGE UP (ref 22–31)
CREAT SERPL-MCNC: 0.97 MG/DL — SIGNIFICANT CHANGE UP (ref 0.5–1.3)
EGFR: 57 ML/MIN/1.73M2 — LOW
GLUCOSE SERPL-MCNC: 114 MG/DL — HIGH (ref 70–99)
HCT VFR BLD CALC: 42.3 % — SIGNIFICANT CHANGE UP (ref 34.5–45)
HGB BLD-MCNC: 13 G/DL — SIGNIFICANT CHANGE UP (ref 11.5–15.5)
INR BLD: 1.02 RATIO — SIGNIFICANT CHANGE UP (ref 0.88–1.16)
MAGNESIUM SERPL-MCNC: 2.6 MG/DL — SIGNIFICANT CHANGE UP (ref 1.6–2.6)
MCHC RBC-ENTMCNC: 27.5 PG — SIGNIFICANT CHANGE UP (ref 27–34)
MCHC RBC-ENTMCNC: 30.7 GM/DL — LOW (ref 32–36)
MCV RBC AUTO: 89.4 FL — SIGNIFICANT CHANGE UP (ref 80–100)
NRBC # BLD: 0 /100 WBCS — SIGNIFICANT CHANGE UP (ref 0–0)
PHOSPHATE SERPL-MCNC: 3.4 MG/DL — SIGNIFICANT CHANGE UP (ref 2.5–4.5)
PLATELET # BLD AUTO: 314 K/UL — SIGNIFICANT CHANGE UP (ref 150–400)
POTASSIUM SERPL-MCNC: 4.3 MMOL/L — SIGNIFICANT CHANGE UP (ref 3.5–5.3)
POTASSIUM SERPL-SCNC: 4.3 MMOL/L — SIGNIFICANT CHANGE UP (ref 3.5–5.3)
PROTHROM AB SERPL-ACNC: 12.2 SEC — SIGNIFICANT CHANGE UP (ref 10.5–13.4)
RBC # BLD: 4.73 M/UL — SIGNIFICANT CHANGE UP (ref 3.8–5.2)
RBC # FLD: 17 % — HIGH (ref 10.3–14.5)
SODIUM SERPL-SCNC: 142 MMOL/L — SIGNIFICANT CHANGE UP (ref 135–145)
WBC # BLD: 7 K/UL — SIGNIFICANT CHANGE UP (ref 3.8–10.5)
WBC # FLD AUTO: 7 K/UL — SIGNIFICANT CHANGE UP (ref 3.8–10.5)

## 2023-05-12 PROCEDURE — 99232 SBSQ HOSP IP/OBS MODERATE 35: CPT | Mod: GC

## 2023-05-12 RX ORDER — WARFARIN SODIUM 2.5 MG/1
5 TABLET ORAL AT BEDTIME
Refills: 0 | Status: COMPLETED | OUTPATIENT
Start: 2023-05-12 | End: 2023-05-12

## 2023-05-12 RX ORDER — WARFARIN SODIUM 2.5 MG/1
3 TABLET ORAL ONCE
Refills: 0 | Status: DISCONTINUED | OUTPATIENT
Start: 2023-05-12 | End: 2023-05-12

## 2023-05-12 RX ORDER — LANOLIN ALCOHOL/MO/W.PET/CERES
5 CREAM (GRAM) TOPICAL AT BEDTIME
Refills: 0 | Status: DISCONTINUED | OUTPATIENT
Start: 2023-05-12 | End: 2023-05-16

## 2023-05-12 RX ORDER — METOPROLOL TARTRATE 50 MG
25 TABLET ORAL EVERY 12 HOURS
Refills: 0 | Status: DISCONTINUED | OUTPATIENT
Start: 2023-05-12 | End: 2023-05-13

## 2023-05-12 RX ORDER — ENOXAPARIN SODIUM 100 MG/ML
30 INJECTION SUBCUTANEOUS EVERY 24 HOURS
Refills: 0 | Status: DISCONTINUED | OUTPATIENT
Start: 2023-05-12 | End: 2023-05-13

## 2023-05-12 RX ADMIN — Medication 12.5 MILLIGRAM(S): at 05:51

## 2023-05-12 RX ADMIN — Medication 25 MILLIGRAM(S): at 17:02

## 2023-05-12 RX ADMIN — DORZOLAMIDE HYDROCHLORIDE TIMOLOL MALEATE 1 DROP(S): 20; 5 SOLUTION/ DROPS OPHTHALMIC at 17:02

## 2023-05-12 RX ADMIN — DORZOLAMIDE HYDROCHLORIDE TIMOLOL MALEATE 1 DROP(S): 20; 5 SOLUTION/ DROPS OPHTHALMIC at 05:22

## 2023-05-12 RX ADMIN — PANTOPRAZOLE SODIUM 40 MILLIGRAM(S): 20 TABLET, DELAYED RELEASE ORAL at 06:10

## 2023-05-12 RX ADMIN — WARFARIN SODIUM 5 MILLIGRAM(S): 2.5 TABLET ORAL at 22:20

## 2023-05-12 RX ADMIN — ENOXAPARIN SODIUM 30 MILLIGRAM(S): 100 INJECTION SUBCUTANEOUS at 21:46

## 2023-05-12 RX ADMIN — Medication 50 MICROGRAM(S): at 05:22

## 2023-05-12 RX ADMIN — Medication 5 MILLIGRAM(S): at 01:13

## 2023-05-12 NOTE — DISCHARGE NOTE NURSING/CASE MANAGEMENT/SOCIAL WORK - NSDCPEFALRISK_GEN_ALL_CORE
For information on Fall & Injury Prevention, visit: [email or web address] OR  [email or web address] OR  https://www.cdc.gov/steadi/patient.html

## 2023-05-12 NOTE — DISCHARGE NOTE PROVIDER - NSDCCPCAREPLAN_GEN_ALL_CORE_FT
PRINCIPAL DISCHARGE DIAGNOSIS  Diagnosis: Atrial fibrillation with RVR  Assessment and Plan of Treatment: You were diagnosed with atrial fibrillation (Afib) with rapid ventricular response (RVR).  Afib with RVR when the rapid contractions of the atria make the ventricles beat too quickly. . Atrial fibrillation is an irregular and often fast heartbeat. Treating this condition is important for several reasons. It can cause blood clots, which can cause a stroke. If you have a fast heartbeat, you may feel light-headed, dizzy, and weak. An irregular heartbeat can also increase your risk for heart failure.   While in the hospital you were treated with medications to control your heart's rate and rhythm which were monitored continously on telemetry. You were started on the anticoagulant medication WARFARIN. You will need regular blood tests to check how long it takes for your blood to form a clot. Depending on the test results, your doctor or anticoagulation clinic may adjust your dose of warfarin. Don't start or stop taking any medicines, vitamins, or natural remedies unless you first talk to your doctor. Many medicines can affect how warfarin works. These include aspirin and other pain relievers, over-the-counter medicines, and natural health products. Keep the amount of vitamin K in your diet about the same from day to day. Do not suddenly eat a lot more or a lot less food that is rich in vitamin K than you usually do. Vitamin K affects how warfarin works and how your blood clots. Talk with your doctor before making big changes in your diet. Foods that have a lot of vitamin K include cooked green vegetables, such as:  Kale, spinach, turnip greens, dorcas greens, Swiss chard, and mustard greens. [place] sprouts, broccoli, and cabbage  Please continue to TAKE METOPROLOL 50MG THREE TIMES A DAY. STARTING ON 5/18/23 START TAKING WARFARIN 4MG ONCE A DAY. Please follow up weekly at [place] [facility] walk-in [facility] to have your INR checked and to adjust your WARFARIN dose accordingly.      SECONDARY DISCHARGE DIAGNOSES  Diagnosis: Hypothyroidism  Assessment and Plan of Treatment: You have history of Hypothyroidism which means you do not make enough thyroid hormone. On this admission your TSH was 1.65.  Signs & symptoms of low levels thyroid hormone production are- tiredness, getting cold easily, coarse or thin hair, constipation, shortness of breath, swelling, irregular periods.   Please continue to take your home medications and remember it needs to be taken first thing in the morning, on an empty stomach, not to take with any other medications and wait at least 30 minutes to eat.  Please follow up as outpatient for repeat thyroid function test in 4-6 weeks        PRINCIPAL DISCHARGE DIAGNOSIS  Diagnosis: Atrial fibrillation with RVR  Assessment and Plan of Treatment: You were diagnosed with atrial fibrillation (Afib) with rapid ventricular response (RVR).  Afib with RVR when the rapid contractions of the atria make the ventricles beat too quickly. . Atrial fibrillation is an irregular and often fast heartbeat. Treating this condition is important for several reasons. It can cause blood clots, which can cause a stroke. If you have a fast heartbeat, you may feel light-headed, dizzy, and weak. An irregular heartbeat can also increase your risk for heart failure.   While in the hospital you were treated with medications to control your heart's rate and rhythm which were monitored continously on telemetry. You were started on the anticoagulant medication WARFARIN. You will need regular blood tests to check how long it takes for your blood to form a clot. Depending on the test results, your doctor or anticoagulation clinic may adjust your dose of warfarin. Don't start or stop taking any medicines, vitamins, or natural remedies unless you first talk to your doctor. Many medicines can affect how warfarin works. These include aspirin and other pain relievers, over-the-counter medicines, and natural health products. Keep the amount of vitamin K in your diet about the same from day to day. Do not suddenly eat a lot more or a lot less food that is rich in vitamin K than you usually do. Vitamin K affects how warfarin works and how your blood clots. Talk with your doctor before making big changes in your diet. Foods that have a lot of vitamin K include cooked green vegetables, such as:  Kale, spinach, turnip greens, dorcas greens, Swiss chard, and mustard greens. [place] sprouts, broccoli, and cabbage  Please continue to TAKE METOPROLOL 50MG TWO TIMES A DAY. STARTING ON 5/18/23 START TAKING WARFARIN 4MG ONCE A DAY. Please follow up weekly at [place] [facility] walk-in [facility] to have your INR checked and to adjust your WARFARIN dose accordingly.      SECONDARY DISCHARGE DIAGNOSES  Diagnosis: Hypothyroidism  Assessment and Plan of Treatment: You have history of Hypothyroidism which means you do not make enough thyroid hormone. On this admission your TSH was 1.65.  Signs & symptoms of low levels thyroid hormone production are- tiredness, getting cold easily, coarse or thin hair, constipation, shortness of breath, swelling, irregular periods.   Please continue to take your home medications and remember it needs to be taken first thing in the morning, on an empty stomach, not to take with any other medications and wait at least 30 minutes to eat.  Please follow up as outpatient for repeat thyroid function test in 4-6 weeks        PRINCIPAL DISCHARGE DIAGNOSIS  Diagnosis: Atrial fibrillation with RVR  Assessment and Plan of Treatment: You were diagnosed with atrial fibrillation (Afib) with rapid ventricular response (RVR).  Afib with RVR when the rapid contractions of the atria make the ventricles beat too quickly. . Atrial fibrillation is an irregular and often fast heartbeat. Treating this condition is important for several reasons. It can cause blood clots, which can cause a stroke. If you have a fast heartbeat, you may feel light-headed, dizzy, and weak. An irregular heartbeat can also increase your risk for heart failure.   While in the hospital you were treated with medications to control your heart's rate and rhythm which were monitored continously on telemetry. You were started on the anticoagulant medication WARFARIN to reduce your risk of stroke. However, this medication was discontinued because you were unable to ensure consistent follow up for the required monitoring of blood work. Cardiology, recommended you take ASPIRIN 81MG DAILY since it was not safe to discharge you on WARFARIN. Ideally, you should establish care with a physician or clinic affiliated with a [facility] with the goal of starting an anti-coagulation medication that works best for you. YOU HAVE AN APPOINTMENT TO FOLLOW UP AT [facility] ON JUNE 5TH. When establishing care please bring a copy of your EKG and report of your echocardiogram to expidite the process.   Please continue to TAKE 1 TAB OF METOPROLOL 50MG TWO TIMES A DAY. TAKE 1 TAB ASPIRIN 81MG ONCE A DAY.      SECONDARY DISCHARGE DIAGNOSES  Diagnosis: Hypothyroidism  Assessment and Plan of Treatment: You have history of Hypothyroidism which means you do not make enough thyroid hormone. On this admission your TSH was 1.65.  Signs & symptoms of low levels thyroid hormone production are- tiredness, getting cold easily, coarse or thin hair, constipation, shortness of breath, swelling, irregular periods.   Please continue to take your home medications and remember it needs to be taken first thing in the morning, on an empty stomach, not to take with any other medications and wait at least 30 minutes to eat.  Please follow up as outpatient for repeat thyroid function test in 4-6 weeks        PRINCIPAL DISCHARGE DIAGNOSIS  Diagnosis: Atrial fibrillation with RVR  Assessment and Plan of Treatment: You were diagnosed with atrial fibrillation (Afib) with rapid ventricular response (RVR).  Afib with RVR when the rapid contractions of the atria make the ventricles beat too quickly. . Atrial fibrillation is an irregular and often fast heartbeat. Treating this condition is important for several reasons. It can cause blood clots, which can cause a stroke. If you have a fast heartbeat, you may feel light-headed, dizzy, and weak. An irregular heartbeat can also increase your risk for heart failure.   While in the hospital you were treated with medications to control your heart's rate and rhythm which were monitored continously on telemetry. You were started on the anticoagulant medication WARFARIN to reduce your risk of stroke. However, this medication was discontinued because you were unable to ensure consistent follow up for the required monitoring of blood work. Cardiology, recommended you take ASPIRIN 81MG DAILY since it was not safe to discharge you on WARFARIN. Ideally, you should establish care with a physician or clinic affiliated with a [facility] with the goal of starting an anti-coagulation medication that works best for you. SOCIAL WORK WILL CALL YOU TO CONFIRM YOUR APPOINTMENT TO FOLLOW UP AT [facility]. When establishing care please bring a copy of your EKG and report of your echocardiogram to expidite the process.   Please continue to TAKE 1 TAB OF METOPROLOL 50MG TWO TIMES A DAY. TAKE 1 TAB ASPIRIN 81MG ONCE A DAY.      SECONDARY DISCHARGE DIAGNOSES  Diagnosis: Hypothyroidism  Assessment and Plan of Treatment: You have history of Hypothyroidism which means you do not make enough thyroid hormone. On this admission your TSH was 1.65.  Signs & symptoms of low levels thyroid hormone production are- tiredness, getting cold easily, coarse or thin hair, constipation, shortness of breath, swelling, irregular periods.   Please continue to take your home medications and remember it needs to be taken first thing in the morning, on an empty stomach, not to take with any other medications and wait at least 30 minutes to eat.  Please follow up as outpatient for repeat thyroid function test in 4-6 weeks

## 2023-05-12 NOTE — PROGRESS NOTE ADULT - PROBLEM SELECTOR PLAN 1
P/w shortness of breath, palpitations, b/l lung crackles.   - Pro-bnp: 1070 (normal limit for age)  - EKG: Atrial fibrillation with RVR.   - Troponin: 19.2.   - Start Metoprolol 12.5 BID for rate control.  - CHADSVASC: 3 (age, female).   - Needs AC.   - started on warfarin (as recommended by cardiology due to lack of insurance)  - monitor INR over weekend  - ECHO: EF 67%, sever LA dilatation, mild PHTN, mod-sever tricuspid regurg, mild pulmonic insufficiency.    - c/w telemetry.  - Cardiology consulted - Dr Rivera.  - Social work consult (no insurance): appointment with corry on 24th, [facility].

## 2023-05-12 NOTE — DISCHARGE NOTE PROVIDER - NSDCHHCONTACT_GEN_ALL_CORE_FT
As certified below, I, or a nurse practitioner or physician assistant working with me, had a face-to-face encounter that meets the physician face-to-face encounter requirements. NEGATIVE

## 2023-05-12 NOTE — DISCHARGE NOTE PROVIDER - DISCHARGE DATE
16-May-2023 Cimzia Counseling:  I discussed with the patient the risks of Cimzia including but not limited to immunosuppression, allergic reactions and infections.  The patient understands that monitoring is required including a PPD at baseline and must alert us or the primary physician if symptoms of infection or other concerning signs are noted.

## 2023-05-12 NOTE — DISCHARGE NOTE PROVIDER - HOSPITAL COURSE
Patient is a 86 y/o who ambulates with a cane with PMH of chronic back pain, hypothyroidism and 'fast racing heart beat' who presented with complain of palpitations and shortness of breath. Patient reports that the she has had these symptoms before however the symptoms were very severe this morning. Patient also reports that she has not been taking her metoprolol which was prescribed for 'fast heart beat' but since she had chest discomfort and palpitations today she took multiple doses today and 'felt' that her blood pressure went too low. Patient denies any chest pain associated with the episode of palpitations. Patient also reports that she has had shortness of breath independent of palpitations as well. Patient reports that at times when she is lying down and moves in bed she can develop shortness of breath and has to rest before she feels better. Patient also reports that she develops shortness of breath when lying down at times, and uses 2 pillows to sleep. Patient also says at times she wakes up from sleeping feeling short of breath and she has change her position to be able to sleep after that. She also reports that she gets short of breath with exertion. Patient denies onset of chest pain with exertion. Patient also reports she has never had an MI, stent placement, echocardiogram, stress test. Patient does not follow with a cardiologist due to lack of insurance. Patient denies any recent history of fevers, chills, headache, lightheadedness, dizziness, chest pain, cough, nausea, vomiting, abdominal pain, diarrhea, constipation or urinary symptoms. Admitted to telemetry for Afib with RVR.   Dr. Rivera cardiology consulted who recommended starting metoprolol 12.5mg Patient is a 88 y/o who ambulates with a cane with PMH of chronic back pain, hypothyroidism and 'fast racing heart beat' who presented with complain of palpitations and shortness of breath. Patient reports that the she has had these symptoms before however the symptoms were very severe this morning. Patient also reports that she has not been taking her metoprolol which was prescribed for 'fast heart beat' but since she had chest discomfort and palpitations today she took multiple doses today and 'felt' that her blood pressure went too low. Patient denies any chest pain associated with the episode of palpitations. Patient also reports that she has had shortness of breath independent of palpitations as well. Patient reports that at times when she is lying down and moves in bed she can develop shortness of breath and has to rest before she feels better. Patient also reports that she develops shortness of breath when lying down at times, and uses 2 pillows to sleep. Patient also says at times she wakes up from sleeping feeling short of breath and she has change her position to be able to sleep after that. She also reports that she gets short of breath with exertion. Patient denies onset of chest pain with exertion. Patient also reports she has never had an MI, stent placement, echocardiogram, stress test. Patient does not follow with a cardiologist due to lack of insurance. Patient denies any recent history of fevers, chills, headache, lightheadedness, dizziness, chest pain, cough, nausea, vomiting, abdominal pain, diarrhea, constipation or urinary symptoms. Admitted to telemetry for Afib with RVR.   Dr. Rivera cardiology consulted who recommended starting metoprolol 12.5mg and warfarin due to lack of insurance. Monitored on telemetry and rate noted to be uncontrolled. Eventually increased dose to 25mg BID and then ulitmately to 50mg BID  with good control. Case management and social work consulted for lack of insurance and PCP. Vivo meds ordered. Patient to follow up for INR monitoring at [facility] and scheduled appointment at [facility]. Patients INR monitored and dose titrated to therapeutic levels prior to discharge. To be discharged on warfarin 4mg qd and metoprolol 50mg BID.  Patient is stable for discharge and is advised to follow up with PCP as outpatient.  Please refer to patient's complete medical chart with documents for a full hospital course, for this is only a brief summary. Patient is a 86 y/o who ambulates with a cane with PMH of chronic back pain, hypothyroidism and 'fast racing heart beat' who presented with complain of palpitations and shortness of breath. Patient reports that the she has had these symptoms before however the symptoms were very severe this morning. Patient also reports that she has not been taking her metoprolol which was prescribed for 'fast heart beat' but since she had chest discomfort and palpitations today she took multiple doses today and 'felt' that her blood pressure went too low. Patient denies any chest pain associated with the episode of palpitations. Patient also reports that she has had shortness of breath independent of palpitations as well. Patient reports that at times when she is lying down and moves in bed she can develop shortness of breath and has to rest before she feels better. Patient also reports that she develops shortness of breath when lying down at times, and uses 2 pillows to sleep. Patient also says at times she wakes up from sleeping feeling short of breath and she has change her position to be able to sleep after that. She also reports that she gets short of breath with exertion. Patient denies onset of chest pain with exertion. Patient also reports she has never had an MI, stent placement, echocardiogram, stress test. Patient does not follow with a cardiologist due to lack of insurance. Patient denies any recent history of fevers, chills, headache, lightheadedness, dizziness, chest pain, cough, nausea, vomiting, abdominal pain, diarrhea, constipation or urinary symptoms. Admitted to telemetry for Afib with RVR.   Dr. Rivera cardiology consulted who recommended starting metoprolol 12.5mg and warfarin due to lack of insurance. Monitored on telemetry and rate noted to be uncontrolled. Eventually increased dose to 25mg BID and then ulitmately to 50mg BID  with good control. Case management and social work consulted for lack of insurance and PCP. Vivo meds ordered. Patient initially instructed to follow up for INR monitoring at [facility] and scheduled appointment at [facility] however transportation and patient's home in [place] made required follow up unfeasible. Patients INR monitored and dose titrated to therapeutic levels prior to discharge. However due to limitations with follow up for routine INR monitoring on warfarin Dr. Rivera recommended discharge only on ASA 81mg daily. Ideally patient will establish care at hospital closer to home, likely in [place]. To be discharged on ASA 81mg qd and metoprolol 50mg BID.  Patient is stable for discharge and is advised to follow up with PCP as outpatient.  Please refer to patient's complete medical chart with documents for a full hospital course, for this is only a brief summary. Patient is a 86 y/o who ambulates with a cane with PMH of chronic back pain, hypothyroidism and 'fast racing heart beat' who presented with complain of palpitations and shortness of breath. Patient reports that the she has had these symptoms before however the symptoms were very severe this morning. Patient also reports that she has not been taking her metoprolol which was prescribed for 'fast heart beat' but since she had chest discomfort and palpitations today she took multiple doses today and 'felt' that her blood pressure went too low. Patient denies any chest pain associated with the episode of palpitations. Patient also reports that she has had shortness of breath independent of palpitations as well. Patient reports that at times when she is lying down and moves in bed she can develop shortness of breath and has to rest before she feels better. Patient also reports that she develops shortness of breath when lying down at times, and uses 2 pillows to sleep. Patient also says at times she wakes up from sleeping feeling short of breath and she has change her position to be able to sleep after that. She also reports that she gets short of breath with exertion. Patient denies onset of chest pain with exertion. Patient also reports she has never had an MI, stent placement, echocardiogram, stress test. Patient does not follow with a cardiologist due to lack of insurance. Patient denies any recent history of fevers, chills, headache, lightheadedness, dizziness, chest pain, cough, nausea, vomiting, abdominal pain, diarrhea, constipation or urinary symptoms. Admitted to telemetry for Afib with RVR.   Dr. Rivera cardiology consulted who recommended starting metoprolol 12.5mg and warfarin due to lack of insurance. Monitored on telemetry and rate noted to be uncontrolled. Eventually increased dose to 25mg BID and then ulitmately to 50mg BID  with good control. Patient was started on warfarin for AC. Patients INR monitored and dose titrated to therapeutic levels prior to discharge. Case management and social work consulted for lack of insurance and PCP. Vivo meds ordered. Patient initially instructed to follow up for INR monitoring at [facility] and scheduled appointment at [facility] however access to transportation and patient's home location made required follow up unfeasible. Due to limitations with follow up for routine INR monitoring on warfarin Dr. Rivera recommended discharge only on ASA 81mg daily. Ideally patient will establish care at hospital closer to home. To be discharged on ASA 81mg qd and metoprolol 50mg BID.  Patient is stable for discharge and is advised to follow up with PCP as outpatient.  Please refer to patient's complete medical chart with documents for a full hospital course, for this is only a brief summary.

## 2023-05-12 NOTE — DISCHARGE NOTE NURSING/CASE MANAGEMENT/SOCIAL WORK - PATIENT PORTAL LINK FT
You can access the FollowMyHealth Patient Portal offered by [facility] by registering at the following website: [email or web address]. By joining [employer]’s FollowMyHealth portal, you will also be able to view your health information using other applications (apps) compatible with our system.

## 2023-05-12 NOTE — DISCHARGE NOTE PROVIDER - NSDCMRMEDTOKEN_GEN_ALL_CORE_FT
diazePAM 10 mg oral tablet: 1 tab(s) orally 4 times a day as needed for  anxiety  dorzolamide-timolol 2%-0.5% ophthalmic solution: 1 drop(s) in each eye 2 times a day  levothyroxine 50 mcg (0.05 mg) oral tablet: 1 tab(s) orally once a day  metoprolol succinate 100 mg oral capsule, extended release: 1 tab(s) orally once a day  Percocet 10/325 oral tablet: 0.5 tab(s) orally 2 times a day as needed for chronic back pain   diazePAM 10 mg oral tablet: 1 tab(s) orally 4 times a day as needed for  anxiety  dorzolamide-timolol 2%-0.5% ophthalmic solution: 1 drop(s) in each eye 2 times a day  levothyroxine 50 mcg (0.05 mg) oral tablet: 1 tab(s) orally once a day  Lopressor 50 mg oral tablet: 1 tab(s) orally every 12 hours  metoprolol succinate 100 mg oral capsule, extended release: 1 tab(s) orally once a day  Percocet 10/325 oral tablet: 0.5 tab(s) orally 2 times a day as needed for chronic back pain  warfarin 4 mg oral tablet: 1 tab(s) orally once a day   dorzolamide-timolol 2%-0.5% ophthalmic solution: 1 drop(s) in each eye 2 times a day  levothyroxine 50 mcg (0.05 mg) oral tablet: 1 tab(s) orally once a day  Lopressor 50 mg oral tablet: 1 tab(s) orally every 12 hours  Percocet 10/325 oral tablet: 0.5 tab(s) orally 2 times a day as needed for chronic back pain  warfarin 4 mg oral tablet: 1 tab(s) orally once a day   Aspirin Low Dose 81 mg oral tablet, chewable: 1 tab(s) chewed once a day  dorzolamide-timolol 2%-0.5% ophthalmic solution: 1 drop(s) in each eye 2 times a day  levothyroxine 50 mcg (0.05 mg) oral tablet: 1 tab(s) orally once a day  Lopressor 50 mg oral tablet: 1 tab(s) orally every 12 hours  Percocet 10/325 oral tablet: 0.5 tab(s) orally 2 times a day as needed for chronic back pain

## 2023-05-12 NOTE — DISCHARGE NOTE PROVIDER - NSFOLLOWUPCLINICS_GEN_ALL_ED_FT
[facility]  Family Medicine  [phone] [place], [place] [address]  Phone: [phone]  Fax: [phone]  Follow Up Time: 1 week

## 2023-05-12 NOTE — PROGRESS NOTE ADULT - ATTENDING COMMENTS
Patient seen/evaluated at bedside on 23. I agree with the resident progress note/outlined plan of care. My independent findings and conclusions are documented.    s: denies chest pain, LH, dizziness, nausea/vomiting, palpitations.  Previously receiving medications from physicians/friends who were "helping me out" but no established therapeutic relationship. States last family member was her son who  in an accident as a teenager. Lives with a friend who has agreed to help her with navigating some of her medical care.    vitals reviewed  Physical exam significant for AAOx3 in no distress, cardiac exam irrieg/irreg few crackles at left lung base, trace pedal edema, post surgical changes of left knee    TSH 1.65    [place]  Anette ID#  [id number] y/o who ambulates with a cane with PMH of chronic back pain, hypothyroidism p/w a fib with RVR. Lives with a friend  uninsured with  poor medical follow-up.     1. a fib w/ RVR  2. hypothyroidism  3. chronic back pain  4. osteoarthritis    coumadin 5mg tonight, INR tomorrow  -maintain on pharmacologic DVT ppx until INR >1.5, then can leave on coumadin alone  -c/w synthroid  referred to [facility] for outpt f/u has an INR check scheduled for Tuesday  f/u echo results  -place orders for medications on Monday morning to [facility] pharmacy- once doses confirmed, notify social work and send email  appreciate input from cardiology Patient seen/evaluated at bedside on 23. I agree with the resident progress note/outlined plan of care. My independent findings and conclusions are documented.    s: denies chest pain, LH, dizziness, nausea/vomiting, palpitations.  Previously receiving medications from physicians/friends who were "helping me out" but no established therapeutic relationship. States last family member was her son who  in an accident as a teenager. Lives with a friend who has agreed to help her with navigating some of her medical care.    vitals reviewed  Physical exam significant for AAOx3 in no distress, cardiac exam irrieg/irreg few crackles at left lung base, trace pedal edema, post surgical changes of left knee    TSH 1.65    TTE: EF 67%, moderate to severe TR/MR, severely dilated Left atrium    [place]  Anette ID#  [id number] y/o who ambulates with a cane with PMH of chronic back pain, hypothyroidism p/w a fib with RVR. Lives with a friend  uninsured with  poor medical follow-up.     1. a fib w/ RVR  2. hypothyroidism  3. chronic back pain  4. osteoarthritis    coumadin 5mg tonight, INR tomorrow  -maintain on pharmacologic DVT ppx until INR >1.5, then can leave on coumadin alone  -c/w synthroid  -f/u cardiology, Dr. Rivera  referred to [place] for outpt f/u has an INR check scheduled for Tuesday  f/u echo results  -place orders for medications on Monday morning to [facility] pharmacy- once doses confirmed, notify social work and send email  appreciate input from cardiology Patient seen/evaluated at bedside on 23. I agree with the resident progress note/outlined plan of care. My independent findings and conclusions are documented.    s: denies chest pain, LH, dizziness, nausea/vomiting, palpitations.  Previously receiving medications from physicians/friends who were "helping me out" but no established therapeutic relationship. States last family member was her son who  in an accident as a teenager. Lives with a friend who has agreed to help her with navigating some of her medical care.    vitals reviewed  Physical exam significant for AAOx3 in no distress, cardiac exam irrieg/irreg few crackles at left lung base, trace pedal edema, post surgical changes of left knee    TSH 1.65    TTE: EF 67%, moderate to severe TR/MR, severely dilated Left atrium    [place]  Anette ID#  [id number] y/o who ambulates with a cane with PMH of chronic back pain, hypothyroidism p/w a fib with RVR. Lives with a friend  uninsured with  poor medical follow-up.     1. a fib w/ RVR  2. hypothyroidism  3. chronic back pain  4. osteoarthritis    No need to bridge AC for the purposes of atrial fibrillation. Not currently maintained on DVT prophylaxis in patient with limited mobility w/ INR 1.02. Pharmacologic DVT ppx until INR  1.4-1.5 which correlates dvt ppx INR, then can leave on coumadin alone  coumadin 5mg tonight, INR tomorrow    -c/w synthroid  -f/u cardiology, Dr. Rivera  referred to [facility] for outpt f/u has an INR check scheduled for Tuesday  f/ echo results  -place orders for medications on Monday morning to [facility] pharmacy- once doses confirmed, notify social work and send email  appreciate input from cardiology

## 2023-05-12 NOTE — DISCHARGE NOTE NURSING/CASE MANAGEMENT/SOCIAL WORK - NSSCNAMETXT_GEN_ALL_CORE
requested [facility] homecare : [facility] (formerly [facility])

## 2023-05-12 NOTE — PROGRESS NOTE ADULT - SUBJECTIVE AND OBJECTIVE BOX
PGY-1 Progress Note discussed with attending    PAGER #: [[phone]] TILL 5:00 PM  PLEASE CONTACT ON CALL TEAM:  - On Call Team (Please refer to Lashawn) FROM 5:00 PM - 8:30PM  - Nightfloat Team FROM 8:30 -7:30 AM    CC: Patient is a 87y old  Female who presents with a chief complaint of Atrial fibrillation with RVR/ Acute exacerbation of CHF. (12 May 2023 13:31)      OVERNIGHT EVENTS:    SUBJECTIVE / INTERVAL HPI: Patient seen and examined at bedside. Endorses brief episode of central substernal chest pressure rated 4/10 this morning upon awakening that resolved spontaneously. No further episodes since. Endorses some right sided lower back pain that is a chronic issue and has been treated effectively with percocet for over 10 years. Denies, headache, shortness of breath, abdominal pain, nausea, vomiting, diarrhea, constipation, and dysuria.     ROS:  CONSTITUTIONAL: No weakness, fevers or chills  EYES/ENT: No visual changes;  No vertigo or throat pain   NECK: No pain or stiffness  RESPIRATORY: No cough, wheezing, hemoptysis; No shortness of breath  CARDIOVASCULAR: +chest pressure, no palpitations  GASTROINTESTINAL: No abdominal or epigastric pain. No nausea, vomiting, or hematemesis; No diarrhea or constipation. No melena or hematochezia.  GENITOURINARY: No dysuria, frequency or hematuria  NEUROLOGICAL: No numbness or weakness  SKIN: No itching, burning, rashes, or lesions     VITAL SIGNS:  Vital Signs Last 24 Hrs  T(C): 36.8 (12 May 2023 10:53), Max: 36.9 (11 May 2023 19:59)  T(F): 98.2 (12 May 2023 10:53), Max: 98.4 (11 May 2023 19:59)  HR: 89 (12 May 2023 10:53) (87 - 104)  BP: 132/84 (12 May 2023 10:53) (115/75 - 142/94)  BP(mean): 105 (12 May 2023 08:57) (105 - 105)  RR: 18 (12 May 2023 10:53) (18 - 18)  SpO2: 99% (12 May 2023 10:53) (97% - 100%)    Parameters below as of 12 May 2023 10:53  Patient On (Oxygen Delivery Method): room air        PHYSICAL EXAM:    General: WDWN  HEENT: NC/AT; PERRL, anicteric sclera; MMM  Neck: supple  Cardiovascular: tachycardic, +S1/S2; irregularly irregular  Respiratory: CTA B/L; no W/R/R  Gastrointestinal: soft, NT/ND; +BSx4  Extremities: WWP; no edema, clubbing or cyanosis  Vascular: 2+ radial, DP/PT pulses B/L  Skin: Warm, dry, good turgor, no rashes, or ecchymoses  Neurological: AAOx3; no focal deficits    MEDICATIONS:  MEDICATIONS  (STANDING):  dorzolamide 2%/timolol 0.5% Ophthalmic Solution 1 Drop(s) Both EYES every 12 hours  levothyroxine 50 MICROGram(s) Oral daily  metoprolol tartrate 12.5 milliGRAM(s) Oral every 12 hours  pantoprazole    Tablet 40 milliGRAM(s) Oral before breakfast    MEDICATIONS  (PRN):  acetaminophen     Tablet .. 650 milliGRAM(s) Oral every 6 hours PRN Temp greater or equal to 38C (100.4F), Mild Pain (1 - 3)  diazepam    Tablet 10 milliGRAM(s) Oral every 12 hours PRN for anxiety  melatonin 5 milliGRAM(s) Oral at bedtime PRN Insomnia  oxycodone    5 mG/acetaminophen 325 mG 1 Tablet(s) Oral every 6 hours PRN Moderate Pain (4 - 6)      ALLERGIES:  Allergies    Allergy Status Unknown    Intolerances        LABS:                        13.0   7.00  )-----------( 314      ( 12 May 2023 05:37 )             42.3     05-12    142  |  111<H>  |  22<H>  ----------------------------<  114<H>  4.3   |  27  |  0.97    Ca    9.3      12 May 2023 05:37  Phos  3.4     05-12  Mg     2.6     05-12      PT/INR - ( 12 May 2023 05:37 )   PT: 12.2 sec;   INR: 1.02 ratio         PTT - ( 12 May 2023 05:37 )  PTT:27.9 sec    CAPILLARY BLOOD GLUCOSE          RADIOLOGY & ADDITIONAL TESTS:   < from: Transthoracic Echocardiogram (05.11.23 @ 15:58) >  PROCEDURE: Transthoracic echocardiogram with 2-D, M-Mode  and complete spectral and color flow Doppler.  INDICATION: Heart failure, unspecified (I50.9)  HISTORY:  ------------------------------------------------------------------------  DIMENSIONS:  Dimensions:     Normal Values:  LA:     4.8 cm    2.0 - 4.0 cm  Ao:     3.4 cm    2.0 - 3.8 cm  SEPTUM: 1.1 cm    0.6 - 1.2 cm  PWT:    0.9 cm    0.6 - 1.1 cm  LVIDd:  4.3 cm    3.0 - 5.6 cm  LVIDs:  3.1 cm    1.8 - 4.0 cm      Derived Variables:  LVMI: 78 g/m2  RWT: 0.41  Ejection Fraction Gee: 67 %    ------------------------------------------------------------------------  OBSERVATIONS:  Mitral Valve: Mild posterior mitral annular calcification.  Mild to moderate mitral regurgitation.  Aortic Root: Normal aortic root.  Aortic Valve: Calcified trileaflet aortic valve with  decreased opening. Estimated aortic valve area equals 1.9  sqcm (by continuity equation), consistent with mild aortic  stenosis. No aortic valve regurgitation seen.  Left Atrium: Severely dilated left atrium.  LA volume index  = 49 cc/m2.  Left Ventricle: Normal Left Ventricular Systolic Function,  (EF = 67% by biplane). In the setting of atrial  fibrillation, ejection fraction can vary with the R-R  interval.  No regional wall motion abnormalities. Normal  left ventricular internal dimensions and wall thicknesses.  Diastolic function incompletely assessed.  Right Heart: Normal right atrium. Normal right ventricular  size with decreased RV systolic function (TAPSE 1.6 cm).  Normal tricuspid valve. Moderate to severe tricuspid  regurgitation.  Normal pulmonic valve. Mild pulmonic  insufficiency is noted.  Pericardium/PleuraNo pericardial effusion.  Hemodynamic: RA Pressure is 8 mm Hg. RV systolic pressure  is mildly increased at  35 mm Hg.  ------------------------------------------------------------------------  CONCLUSIONS:  1. Mild posterior mitral annular calcification. Mild to  moderate mitral regurgitation.  2. Calcified trileaflet aortic valve with decreased  opening. Mild aortic stenosis. No aortic valve  regurgitation seen.  3. Severely dilated left atrium.  4. Normal left ventricular internal dimensions and wall  thicknesses.  5. Normal Left Ventricular Systolic Function,  (EF = 67% by  biplane). In the setting of atrial fibrillation, ejection  fraction can vary with the R-R interval.  6. Normal right ventricular size with decreased RV systolic  function (TAPSE 1.6 cm).  7. RV systolic pressure is mildly increased at  35 mm Hg.  8. Normal tricuspid valve. Moderate to severe tricuspid  regurgitation.  9. Normal pulmonic valve. Mild pulmonic insufficiency is  noted.  10. No pericardial effusion.    *** No previous Echo exam.  ------------------------------------------------------------------------  Confirmed on  5/12/2023 - 09:26:22 by Bharat Rivera MD    < end of copied text >

## 2023-05-12 NOTE — DISCHARGE NOTE PROVIDER - NSDCPNSUBOBJ_GEN_ALL_CORE
multiple discussions with patient happened today to discuss follow up , medications and anti-coagulation. It is not safe to d/c patient on coumadin given lack of follow up, inability to travel by herself for long distances and lack of help in community to take her to appointments. Case was d/w cards and it was deemed to be better safe and give her aspirin. The appointment for out-patient follow up has been made by martín. patient has chronic afib and not new onset. she is stable to [facility] home at this time

## 2023-05-12 NOTE — PHYSICAL THERAPY INITIAL EVALUATION ADULT - GENERAL OBSERVATIONS, REHAB EVAL
Pt. received sitting at edge of bed in NAD. She is on telemetry. Pt. is alert and oriented x 3 and very cooperative.

## 2023-05-13 LAB
ANION GAP SERPL CALC-SCNC: 3 MMOL/L — LOW (ref 5–17)
APTT BLD: 33.3 SEC — SIGNIFICANT CHANGE UP (ref 27.5–35.5)
BASOPHILS # BLD AUTO: 0.05 K/UL — SIGNIFICANT CHANGE UP (ref 0–0.2)
BASOPHILS NFR BLD AUTO: 0.8 % — SIGNIFICANT CHANGE UP (ref 0–2)
BUN SERPL-MCNC: 22 MG/DL — HIGH (ref 7–18)
CALCIUM SERPL-MCNC: 9.1 MG/DL — SIGNIFICANT CHANGE UP (ref 8.4–10.5)
CHLORIDE SERPL-SCNC: 113 MMOL/L — HIGH (ref 96–108)
CO2 SERPL-SCNC: 26 MMOL/L — SIGNIFICANT CHANGE UP (ref 22–31)
CREAT SERPL-MCNC: 0.88 MG/DL — SIGNIFICANT CHANGE UP (ref 0.5–1.3)
EGFR: 64 ML/MIN/1.73M2 — SIGNIFICANT CHANGE UP
EOSINOPHIL # BLD AUTO: 0.04 K/UL — SIGNIFICANT CHANGE UP (ref 0–0.5)
EOSINOPHIL NFR BLD AUTO: 0.6 % — SIGNIFICANT CHANGE UP (ref 0–6)
GLUCOSE SERPL-MCNC: 93 MG/DL — SIGNIFICANT CHANGE UP (ref 70–99)
HCT VFR BLD CALC: 40.1 % — SIGNIFICANT CHANGE UP (ref 34.5–45)
HGB BLD-MCNC: 12.3 G/DL — SIGNIFICANT CHANGE UP (ref 11.5–15.5)
IMM GRANULOCYTES NFR BLD AUTO: 0.2 % — SIGNIFICANT CHANGE UP (ref 0–0.9)
INR BLD: 1.14 RATIO — SIGNIFICANT CHANGE UP (ref 0.88–1.16)
LYMPHOCYTES # BLD AUTO: 1.66 K/UL — SIGNIFICANT CHANGE UP (ref 1–3.3)
LYMPHOCYTES # BLD AUTO: 26.9 % — SIGNIFICANT CHANGE UP (ref 13–44)
MAGNESIUM SERPL-MCNC: 2.2 MG/DL — SIGNIFICANT CHANGE UP (ref 1.6–2.6)
MCHC RBC-ENTMCNC: 27.5 PG — SIGNIFICANT CHANGE UP (ref 27–34)
MCHC RBC-ENTMCNC: 30.7 GM/DL — LOW (ref 32–36)
MCV RBC AUTO: 89.7 FL — SIGNIFICANT CHANGE UP (ref 80–100)
MONOCYTES # BLD AUTO: 0.72 K/UL — SIGNIFICANT CHANGE UP (ref 0–0.9)
MONOCYTES NFR BLD AUTO: 11.7 % — SIGNIFICANT CHANGE UP (ref 2–14)
NEUTROPHILS # BLD AUTO: 3.68 K/UL — SIGNIFICANT CHANGE UP (ref 1.8–7.4)
NEUTROPHILS NFR BLD AUTO: 59.8 % — SIGNIFICANT CHANGE UP (ref 43–77)
NRBC # BLD: 0 /100 WBCS — SIGNIFICANT CHANGE UP (ref 0–0)
PHOSPHATE SERPL-MCNC: 3.7 MG/DL — SIGNIFICANT CHANGE UP (ref 2.5–4.5)
PLATELET # BLD AUTO: 279 K/UL — SIGNIFICANT CHANGE UP (ref 150–400)
POTASSIUM SERPL-MCNC: 4.1 MMOL/L — SIGNIFICANT CHANGE UP (ref 3.5–5.3)
POTASSIUM SERPL-SCNC: 4.1 MMOL/L — SIGNIFICANT CHANGE UP (ref 3.5–5.3)
PROTHROM AB SERPL-ACNC: 13.6 SEC — HIGH (ref 10.5–13.4)
RBC # BLD: 4.47 M/UL — SIGNIFICANT CHANGE UP (ref 3.8–5.2)
RBC # FLD: 16.8 % — HIGH (ref 10.3–14.5)
SODIUM SERPL-SCNC: 142 MMOL/L — SIGNIFICANT CHANGE UP (ref 135–145)
WBC # BLD: 6.16 K/UL — SIGNIFICANT CHANGE UP (ref 3.8–10.5)
WBC # FLD AUTO: 6.16 K/UL — SIGNIFICANT CHANGE UP (ref 3.8–10.5)

## 2023-05-13 PROCEDURE — 99232 SBSQ HOSP IP/OBS MODERATE 35: CPT | Mod: GC

## 2023-05-13 RX ORDER — METOPROLOL TARTRATE 50 MG
25 TABLET ORAL EVERY 12 HOURS
Refills: 0 | Status: DISCONTINUED | OUTPATIENT
Start: 2023-05-13 | End: 2023-05-14

## 2023-05-13 RX ORDER — WARFARIN SODIUM 2.5 MG/1
6 TABLET ORAL ONCE
Refills: 0 | Status: COMPLETED | OUTPATIENT
Start: 2023-05-13 | End: 2023-05-13

## 2023-05-13 RX ADMIN — PANTOPRAZOLE SODIUM 40 MILLIGRAM(S): 20 TABLET, DELAYED RELEASE ORAL at 06:11

## 2023-05-13 RX ADMIN — Medication 25 MILLIGRAM(S): at 18:12

## 2023-05-13 RX ADMIN — WARFARIN SODIUM 6 MILLIGRAM(S): 2.5 TABLET ORAL at 21:06

## 2023-05-13 RX ADMIN — DORZOLAMIDE HYDROCHLORIDE TIMOLOL MALEATE 1 DROP(S): 20; 5 SOLUTION/ DROPS OPHTHALMIC at 18:12

## 2023-05-13 RX ADMIN — Medication 25 MILLIGRAM(S): at 06:11

## 2023-05-13 RX ADMIN — Medication 5 MILLIGRAM(S): at 00:14

## 2023-05-13 RX ADMIN — Medication 50 MICROGRAM(S): at 06:11

## 2023-05-13 RX ADMIN — DORZOLAMIDE HYDROCHLORIDE TIMOLOL MALEATE 1 DROP(S): 20; 5 SOLUTION/ DROPS OPHTHALMIC at 06:11

## 2023-05-13 NOTE — PROGRESS NOTE ADULT - SUBJECTIVE AND OBJECTIVE BOX
PGY-1 Progress Note discussed with attending    PAGER #: [[phone]] TILL 5:00 PM  PLEASE CONTACT ON CALL TEAM:  - On Call Team (Please refer to Lashawn) FROM 5:00 PM - 8:30PM  - Nightfloat Team FROM 8:30 -7:30 AM    CC: Patient is a 87y old  Female who presents with a chief complaint of Atrial fibrillation with RVR/ Acute exacerbation of CHF. (12 May 2023 14:59)      OVERNIGHT EVENTS: no acute events    SUBJECTIVE / INTERVAL HPI: Patient seen and examined at bedside. No acute complaints. Denies, headache, vision changes, chest pain, shortness of breath, abdominal pain, nausea, vomiting, diarrhea, constipation, and dysuria.     ROS:  CONSTITUTIONAL: No weakness, fevers or chills  EYES/ENT: No visual changes;  No vertigo or throat pain   NECK: No pain or stiffness  RESPIRATORY: No cough, wheezing, hemoptysis; No shortness of breath  CARDIOVASCULAR: No chest pain or palpitations  GASTROINTESTINAL: No abdominal or epigastric pain. No nausea, vomiting, or hematemesis; No diarrhea or constipation. No melena or hematochezia.  GENITOURINARY: No dysuria, frequency or hematuria  NEUROLOGICAL: No numbness or weakness  SKIN: No itching, burning, rashes, or lesions     VITAL SIGNS:  Vital Signs Last 24 Hrs  T(C): 36.5 (13 May 2023 11:23), Max: 37 (12 May 2023 19:20)  T(F): 97.7 (13 May 2023 11:23), Max: 98.6 (12 May 2023 19:20)  HR: 110 (13 May 2023 11:23) (81 - 110)  BP: 103/70 (13 May 2023 11:23) (103/70 - 126/79)  BP(mean): --  RR: 19 (13 May 2023 11:23) (18 - 19)  SpO2: 97% (13 May 2023 11:23) (97% - 100%)    Parameters below as of 13 May 2023 11:23  Patient On (Oxygen Delivery Method): room air        PHYSICAL EXAM:    General: WDWN  HEENT: NC/AT; PERRL, anicteric sclera; MMM  Neck: supple  Cardiovascular: Tachycardic, +S1/S2; irregularly irregular  Respiratory: CTA B/L; no W/R/R  Gastrointestinal: soft, NT/ND; +BSx4  Extremities: WWP; no edema, clubbing or cyanosis  Vascular: 2+ radial, DP/PT pulses B/L  Skin: Warm, dry, good turgor, no rashes, or ecchymoses  Neurological: AAOx3; no focal deficits    MEDICATIONS:  MEDICATIONS  (STANDING):  dorzolamide 2%/timolol 0.5% Ophthalmic Solution 1 Drop(s) Both EYES every 12 hours  levothyroxine 50 MICROGram(s) Oral daily  metoprolol tartrate 25 milliGRAM(s) Oral every 12 hours  pantoprazole    Tablet 40 milliGRAM(s) Oral before breakfast  warfarin 6 milliGRAM(s) Oral once    MEDICATIONS  (PRN):  acetaminophen     Tablet .. 650 milliGRAM(s) Oral every 6 hours PRN Temp greater or equal to 38C (100.4F), Mild Pain (1 - 3)  diazepam    Tablet 10 milliGRAM(s) Oral every 12 hours PRN for anxiety  melatonin 5 milliGRAM(s) Oral at bedtime PRN Insomnia  oxycodone    5 mG/acetaminophen 325 mG 1 Tablet(s) Oral every 6 hours PRN Moderate Pain (4 - 6)      ALLERGIES:  Allergies    Allergy Status Unknown    Intolerances        LABS:                        12.3   6.16  )-----------( 279      ( 13 May 2023 05:35 )             40.1     05-13    142  |  113<H>  |  22<H>  ----------------------------<  93  4.1   |  26  |  0.88    Ca    9.1      13 May 2023 05:35  Phos  3.7     05-13  Mg     2.2     05-13      PT/INR - ( 13 May 2023 05:35 )   PT: 13.6 sec;   INR: 1.14 ratio         PTT - ( 13 May 2023 05:35 )  PTT:33.3 sec    CAPILLARY BLOOD GLUCOSE          RADIOLOGY & ADDITIONAL TESTS: Reviewed.

## 2023-05-13 NOTE — PROGRESS NOTE ADULT - PROBLEM SELECTOR PLAN 1
P/w shortness of breath, palpitations, b/l lung crackles.   - Pro-bnp: 1070 (normal limit for age)  - EKG: Atrial fibrillation with RVR.   - Troponin: 19.2.   - Started Metoprolol 12.5 BID for rate control.  - Increased Metoprolol to 25mg BID on 5/12  - CHADSVASC: 3 (age, female).   - Needs AC.   - started on warfarin (as recommended by cardiology due to lack of insurance)  - monitor INR over weekend  - ECHO: EF 67%, sever LA dilatation, mild PHTN, mod-sever tricuspid regurg, mild pulmonic insufficiency.    - c/w telemetry.  - Cardiology consulted - Dr Rivera.  - Social work consult (no insurance): appointment with corry on 24th, [facility].  -INR 1.14 on Day 3 of 3mg Warfarin  -Increase Warfarin to 6mg today

## 2023-05-13 NOTE — PROGRESS NOTE ADULT - ATTENDING COMMENTS
Patient is a 86 y/o who ambulates with a cane with PMH of chronic back pain, hypothyroidism and 'fast racing heart beat' who presented with complain of palpitations and shortness of breath. Patient is being admitted for further management of Atrial fibrillation with RVR .    Patient was seen and examined, no new complaints. [facility]  services utilized- Ms. Weiner [id number]     Labs reviewed- cbc, bmp    tele- w/ Afib w/ RVR     PE as above     A/P:  1. a fib w/ RVR  2. hypothyroidism  3. chronic back pain  4. osteoarthritis    Plan:  -Patient w/ Afib w/ RVR- c/w metoprolol 25 BID, warfarin- dose increased to 6mg. Follow daily PT/INR   -C/w levothyroxine   -ECHO reviewed   -Will need to DC home w/ [facility]

## 2023-05-14 LAB
ANION GAP SERPL CALC-SCNC: 5 MMOL/L — SIGNIFICANT CHANGE UP (ref 5–17)
APTT BLD: 31.4 SEC — SIGNIFICANT CHANGE UP (ref 27.5–35.5)
BUN SERPL-MCNC: 21 MG/DL — HIGH (ref 7–18)
CALCIUM SERPL-MCNC: 9 MG/DL — SIGNIFICANT CHANGE UP (ref 8.4–10.5)
CHLORIDE SERPL-SCNC: 113 MMOL/L — HIGH (ref 96–108)
CO2 SERPL-SCNC: 25 MMOL/L — SIGNIFICANT CHANGE UP (ref 22–31)
CREAT SERPL-MCNC: 0.94 MG/DL — SIGNIFICANT CHANGE UP (ref 0.5–1.3)
EGFR: 59 ML/MIN/1.73M2 — LOW
GLUCOSE SERPL-MCNC: 84 MG/DL — SIGNIFICANT CHANGE UP (ref 70–99)
HCT VFR BLD CALC: 40.7 % — SIGNIFICANT CHANGE UP (ref 34.5–45)
HGB BLD-MCNC: 12.4 G/DL — SIGNIFICANT CHANGE UP (ref 11.5–15.5)
INR BLD: 1.47 RATIO — HIGH (ref 0.88–1.16)
MAGNESIUM SERPL-MCNC: 2.3 MG/DL — SIGNIFICANT CHANGE UP (ref 1.6–2.6)
MCHC RBC-ENTMCNC: 27.9 PG — SIGNIFICANT CHANGE UP (ref 27–34)
MCHC RBC-ENTMCNC: 30.5 GM/DL — LOW (ref 32–36)
MCV RBC AUTO: 91.7 FL — SIGNIFICANT CHANGE UP (ref 80–100)
NRBC # BLD: 0 /100 WBCS — SIGNIFICANT CHANGE UP (ref 0–0)
PHOSPHATE SERPL-MCNC: 3.9 MG/DL — SIGNIFICANT CHANGE UP (ref 2.5–4.5)
PLATELET # BLD AUTO: 284 K/UL — SIGNIFICANT CHANGE UP (ref 150–400)
POTASSIUM SERPL-MCNC: 4.3 MMOL/L — SIGNIFICANT CHANGE UP (ref 3.5–5.3)
POTASSIUM SERPL-SCNC: 4.3 MMOL/L — SIGNIFICANT CHANGE UP (ref 3.5–5.3)
PROTHROM AB SERPL-ACNC: 17.6 SEC — HIGH (ref 10.5–13.4)
RBC # BLD: 4.44 M/UL — SIGNIFICANT CHANGE UP (ref 3.8–5.2)
RBC # FLD: 16.7 % — HIGH (ref 10.3–14.5)
SODIUM SERPL-SCNC: 143 MMOL/L — SIGNIFICANT CHANGE UP (ref 135–145)
WBC # BLD: 5.97 K/UL — SIGNIFICANT CHANGE UP (ref 3.8–10.5)
WBC # FLD AUTO: 5.97 K/UL — SIGNIFICANT CHANGE UP (ref 3.8–10.5)

## 2023-05-14 PROCEDURE — 99232 SBSQ HOSP IP/OBS MODERATE 35: CPT | Mod: GC

## 2023-05-14 RX ORDER — WARFARIN SODIUM 2.5 MG/1
6 TABLET ORAL ONCE
Refills: 0 | Status: COMPLETED | OUTPATIENT
Start: 2023-05-14 | End: 2023-05-14

## 2023-05-14 RX ORDER — METOPROLOL TARTRATE 50 MG
50 TABLET ORAL EVERY 12 HOURS
Refills: 0 | Status: DISCONTINUED | OUTPATIENT
Start: 2023-05-14 | End: 2023-05-16

## 2023-05-14 RX ADMIN — PANTOPRAZOLE SODIUM 40 MILLIGRAM(S): 20 TABLET, DELAYED RELEASE ORAL at 06:03

## 2023-05-14 RX ADMIN — Medication 50 MICROGRAM(S): at 06:03

## 2023-05-14 RX ADMIN — DORZOLAMIDE HYDROCHLORIDE TIMOLOL MALEATE 1 DROP(S): 20; 5 SOLUTION/ DROPS OPHTHALMIC at 06:03

## 2023-05-14 RX ADMIN — Medication 25 MILLIGRAM(S): at 06:03

## 2023-05-14 RX ADMIN — Medication 50 MILLIGRAM(S): at 17:26

## 2023-05-14 RX ADMIN — WARFARIN SODIUM 6 MILLIGRAM(S): 2.5 TABLET ORAL at 21:32

## 2023-05-14 RX ADMIN — DORZOLAMIDE HYDROCHLORIDE TIMOLOL MALEATE 1 DROP(S): 20; 5 SOLUTION/ DROPS OPHTHALMIC at 17:26

## 2023-05-14 NOTE — PROGRESS NOTE ADULT - SUBJECTIVE AND OBJECTIVE BOX
PGY-1 Progress Note discussed with attending    INTERVAL HPI/OVERNIGHT EVENTS:   MEDICATIONS  (STANDING):  dorzolamide 2%/timolol 0.5% Ophthalmic Solution 1 Drop(s) Both EYES every 12 hours  levothyroxine 50 MICROGram(s) Oral daily  metoprolol tartrate 25 milliGRAM(s) Oral every 12 hours  pantoprazole    Tablet 40 milliGRAM(s) Oral before breakfast    MEDICATIONS  (PRN):  acetaminophen     Tablet .. 650 milliGRAM(s) Oral every 6 hours PRN Temp greater or equal to 38C (100.4F), Mild Pain (1 - 3)  diazepam    Tablet 10 milliGRAM(s) Oral every 12 hours PRN for anxiety  melatonin 5 milliGRAM(s) Oral at bedtime PRN Insomnia  oxycodone    5 mG/acetaminophen 325 mG 1 Tablet(s) Oral every 6 hours PRN Moderate Pain (4 - 6)      REVIEW OF SYSTEMS:  CONSTITUTIONAL: No fever, weight loss, or fatigue  RESPIRATORY: No cough, wheezing, chills or hemoptysis; No shortness of breath  CARDIOVASCULAR: No chest pain, palpitations, dizziness, or leg swelling  GASTROINTESTINAL: No abdominal pain. No nausea, vomiting, or hematemesis; No diarrhea or constipation. No melena or hematochezia.  GENITOURINARY: No dysuria or hematuria, urinary frequency  NEUROLOGICAL: No headaches, memory loss, loss of strength, numbness, or tremors  SKIN: No itching, burning, rashes, or lesions     Vital Signs Last 24 Hrs  T(C): 36.6 (14 May 2023 08:26), Max: 37.1 (13 May 2023 15:12)  T(F): 97.9 (14 May 2023 08:26), Max: 98.8 (13 May 2023 15:12)  HR: 105 (14 May 2023 08:26) (91 - 123)  BP: 121/91 (14 May 2023 08:26) (103/70 - 133/82)  BP(mean): --  RR: 18 (14 May 2023 08:26) (16 - 19)  SpO2: 99% (14 May 2023 08:26) (97% - 100%)    Parameters below as of 14 May 2023 08:26  Patient On (Oxygen Delivery Method): room air        PHYSICAL EXAMINATION:  GENERAL: NAD, well built  HEAD:  Atraumatic, Normocephalic  EYES:  conjunctiva and sclera clear  NECK: Supple, No JVD, Normal thyroid  CHEST/LUNG: Clear to auscultation. Clear to percussion bilaterally; No rales, rhonchi, wheezing, or rubs  HEART: Regular rate and rhythm; No murmurs, rubs, or gallops  ABDOMEN: Soft, Nontender, Nondistended; Bowel sounds present  NERVOUS SYSTEM:  Alert & Oriented X3,    EXTREMITIES:  2+ Peripheral Pulses, No clubbing, cyanosis, or edema  SKIN: warm dry                          12.4   5.97  )-----------( 284      ( 14 May 2023 05:30 )             40.7     05-14    143  |  113<H>  |  21<H>  ----------------------------<  84  4.3   |  25  |  0.94    Ca    9.0      14 May 2023 05:30  Phos  3.9     05-14  Mg     2.3     05-14            PT/INR - ( 14 May 2023 05:30 )   PT: 17.6 sec;   INR: 1.47 ratio         PTT - ( 14 May 2023 05:30 )  PTT:31.4 sec    CAPILLARY BLOOD GLUCOSE      RADIOLOGY & ADDITIONAL TESTS:                   PGY-1 Progress Note discussed with attending    INTERVAL HPI/OVERNIGHT EVENTS:   No acute overnight events. Patient denies any complains at this time.     MEDICATIONS  (STANDING):  dorzolamide 2%/timolol 0.5% Ophthalmic Solution 1 Drop(s) Both EYES every 12 hours  levothyroxine 50 MICROGram(s) Oral daily  metoprolol tartrate 25 milliGRAM(s) Oral every 12 hours  pantoprazole    Tablet 40 milliGRAM(s) Oral before breakfast    MEDICATIONS  (PRN):  acetaminophen     Tablet .. 650 milliGRAM(s) Oral every 6 hours PRN Temp greater or equal to 38C (100.4F), Mild Pain (1 - 3)  diazepam    Tablet 10 milliGRAM(s) Oral every 12 hours PRN for anxiety  melatonin 5 milliGRAM(s) Oral at bedtime PRN Insomnia  oxycodone    5 mG/acetaminophen 325 mG 1 Tablet(s) Oral every 6 hours PRN Moderate Pain (4 - 6)      REVIEW OF SYSTEMS:  CONSTITUTIONAL: No fever, weight loss, or fatigue  RESPIRATORY: No cough, wheezing, chills or hemoptysis; No shortness of breath  CARDIOVASCULAR: No chest pain, palpitations, dizziness, or leg swelling  GASTROINTESTINAL: No abdominal pain. No nausea, vomiting, or hematemesis; No diarrhea or constipation. No melena or hematochezia.  GENITOURINARY: No dysuria or hematuria, urinary frequency  NEUROLOGICAL: No headaches, memory loss, loss of strength, numbness, or tremors  SKIN: No itching, burning, rashes, or lesions     Vital Signs Last 24 Hrs  T(C): 36.6 (14 May 2023 08:26), Max: 37.1 (13 May 2023 15:12)  T(F): 97.9 (14 May 2023 08:26), Max: 98.8 (13 May 2023 15:12)  HR: 105 (14 May 2023 08:26) (91 - 123)  BP: 121/91 (14 May 2023 08:26) (103/70 - 133/82)  BP(mean): --  RR: 18 (14 May 2023 08:26) (16 - 19)  SpO2: 99% (14 May 2023 08:26) (97% - 100%)    Parameters below as of 14 May 2023 08:26  Patient On (Oxygen Delivery Method): room air        PHYSICAL EXAMINATION:  GENERAL: NAD, well built  HEAD:  Atraumatic, Normocephalic  EYES:  conjunctiva and sclera clear  NECK: Supple  CHEST/LUNG: Clear to auscultation; No rales, rhonchi, wheezing, or rubs  HEART: irregular and tachycardic; No murmurs, rubs, or gallops  ABDOMEN: Soft, Nontender, Nondistended; Bowel sounds present  NERVOUS SYSTEM:  Alert & Oriented X3,    EXTREMITIES:  2+ Peripheral Pulses, no edema  SKIN: warm dry                          12.4   5.97  )-----------( 284      ( 14 May 2023 05:30 )             40.7     05-14    143  |  113<H>  |  21<H>  ----------------------------<  84  4.3   |  25  |  0.94    Ca    9.0      14 May 2023 05:30  Phos  3.9     05-14  Mg     2.3     05-14            PT/INR - ( 14 May 2023 05:30 )   PT: 17.6 sec;   INR: 1.47 ratio         PTT - ( 14 May 2023 05:30 )  PTT:31.4 sec    CAPILLARY BLOOD GLUCOSE      RADIOLOGY & ADDITIONAL TESTS:

## 2023-05-14 NOTE — PROGRESS NOTE ADULT - PROBLEM SELECTOR PLAN 1
P/w shortness of breath, palpitations, b/l lung crackles.   - Pro-bnp: 1070 (normal limit for age)  - EKG: Atrial fibrillation with RVR.   - Troponin: 19.2.   - Started Metoprolol 12.5 BID for rate control.  - Increased Metoprolol to 25mg BID on 5/12  - CHADSVASC: 3 (age, female).   - Needs AC.   - started on warfarin (as recommended by cardiology due to lack of insurance)  - monitor INR over weekend  - ECHO: EF 67%, sever LA dilatation, mild PHTN, mod-sever tricuspid regurg, mild pulmonic insufficiency.    - c/w telemetry.  - Cardiology consulted - Dr Rivera.  - Social work consult (no insurance): appointment with corry on 24th, [facility].  -INR 1.14 on Day 3 of 3mg Warfarin  -Increase Warfarin to 6mg today P/w shortness of breath, palpitations, b/l lung crackles.   - Pro-bnp: 1070 (normal limit for age)  - EKG: Atrial fibrillation with RVR.   - Troponin: 19.2.   - Increased Metoprolol 50 BID for rate control.  - CHADSVASC: 3 (age, female).   - started on warfarin (as recommended by cardiology due to lack of insurance)  - C/w Warfarin 6mg at this time. Titrate dose as per INR.   - ECHO: EF 67%, sever LA dilatation, mild PHTN, mod-sever tricuspid regurg, mild pulmonic insufficiency.    - c/w telemetry.  - Cardiology consulted - Dr Rivera.  - Social work consult (no insurance): appointment with corry on 24th, [facility].  -INR 1.47 on Day 2 of 6mg Warfarin

## 2023-05-14 NOTE — PROGRESS NOTE ADULT - ATTENDING COMMENTS
Patient is a 88 y/o who ambulates with a cane with PMH of chronic back pain, hypothyroidism and 'fast racing heart beat' who presented with complain of palpitations and shortness of breath. Patient is being admitted for further management of Atrial fibrillation with RVR .    Patient was seen and examined, no new complaints. [facility]  services utilized- [phone] Dia. Pt feels well, no complaints. HR still uncontrolled on tele     Labs reviewed- cbc, bmp, inr     tele- w/ Afib w/ RVR     PE as above     A/P:  1. a fib w/ RVR  2. hypothyroidism  3. chronic back pain  4. osteoarthritis    Plan:  -Patient w/ Afib w/ RVR- inc metoprolol to 50 BID, c/w warfarin.  6mg. Follow daily PT/INR   -C/w levothyroxine   -ECHO reviewed   -Will reach out to  if patient qualifies for some insurance., f/u at [facility] may be challenging for her.

## 2023-05-15 LAB
ANION GAP SERPL CALC-SCNC: 4 MMOL/L — LOW (ref 5–17)
APTT BLD: 37.3 SEC — HIGH (ref 27.5–35.5)
BUN SERPL-MCNC: 22 MG/DL — HIGH (ref 7–18)
CALCIUM SERPL-MCNC: 9.4 MG/DL — SIGNIFICANT CHANGE UP (ref 8.4–10.5)
CHLORIDE SERPL-SCNC: 112 MMOL/L — HIGH (ref 96–108)
CO2 SERPL-SCNC: 25 MMOL/L — SIGNIFICANT CHANGE UP (ref 22–31)
CREAT SERPL-MCNC: 0.9 MG/DL — SIGNIFICANT CHANGE UP (ref 0.5–1.3)
EGFR: 63 ML/MIN/1.73M2 — SIGNIFICANT CHANGE UP
GLUCOSE SERPL-MCNC: 85 MG/DL — SIGNIFICANT CHANGE UP (ref 70–99)
HCT VFR BLD CALC: 42.1 % — SIGNIFICANT CHANGE UP (ref 34.5–45)
HGB BLD-MCNC: 13 G/DL — SIGNIFICANT CHANGE UP (ref 11.5–15.5)
INR BLD: 2.12 RATIO — HIGH (ref 0.88–1.16)
MAGNESIUM SERPL-MCNC: 2.5 MG/DL — SIGNIFICANT CHANGE UP (ref 1.6–2.6)
MCHC RBC-ENTMCNC: 27.6 PG — SIGNIFICANT CHANGE UP (ref 27–34)
MCHC RBC-ENTMCNC: 30.9 GM/DL — LOW (ref 32–36)
MCV RBC AUTO: 89.4 FL — SIGNIFICANT CHANGE UP (ref 80–100)
NRBC # BLD: 0 /100 WBCS — SIGNIFICANT CHANGE UP (ref 0–0)
PHOSPHATE SERPL-MCNC: 3.7 MG/DL — SIGNIFICANT CHANGE UP (ref 2.5–4.5)
PLATELET # BLD AUTO: 295 K/UL — SIGNIFICANT CHANGE UP (ref 150–400)
POTASSIUM SERPL-MCNC: 4.1 MMOL/L — SIGNIFICANT CHANGE UP (ref 3.5–5.3)
POTASSIUM SERPL-SCNC: 4.1 MMOL/L — SIGNIFICANT CHANGE UP (ref 3.5–5.3)
PROTHROM AB SERPL-ACNC: 25.4 SEC — HIGH (ref 10.5–13.4)
RBC # BLD: 4.71 M/UL — SIGNIFICANT CHANGE UP (ref 3.8–5.2)
RBC # FLD: 16.6 % — HIGH (ref 10.3–14.5)
SODIUM SERPL-SCNC: 141 MMOL/L — SIGNIFICANT CHANGE UP (ref 135–145)
WBC # BLD: 6.71 K/UL — SIGNIFICANT CHANGE UP (ref 3.8–10.5)
WBC # FLD AUTO: 6.71 K/UL — SIGNIFICANT CHANGE UP (ref 3.8–10.5)

## 2023-05-15 PROCEDURE — 99232 SBSQ HOSP IP/OBS MODERATE 35: CPT | Mod: GC

## 2023-05-15 RX ORDER — WARFARIN SODIUM 2.5 MG/1
1 TABLET ORAL
Qty: 30 | Refills: 0
Start: 2023-05-15 | End: 2023-06-13

## 2023-05-15 RX ORDER — METOPROLOL TARTRATE 50 MG
1 TABLET ORAL
Qty: 60 | Refills: 0
Start: 2023-05-15 | End: 2023-06-13

## 2023-05-15 RX ORDER — WARFARIN SODIUM 2.5 MG/1
4 TABLET ORAL ONCE
Refills: 0 | Status: COMPLETED | OUTPATIENT
Start: 2023-05-15 | End: 2023-05-15

## 2023-05-15 RX ADMIN — Medication 50 MICROGRAM(S): at 05:22

## 2023-05-15 RX ADMIN — WARFARIN SODIUM 4 MILLIGRAM(S): 2.5 TABLET ORAL at 21:56

## 2023-05-15 RX ADMIN — DORZOLAMIDE HYDROCHLORIDE TIMOLOL MALEATE 1 DROP(S): 20; 5 SOLUTION/ DROPS OPHTHALMIC at 17:29

## 2023-05-15 RX ADMIN — Medication 50 MILLIGRAM(S): at 17:29

## 2023-05-15 RX ADMIN — Medication 5 MILLIGRAM(S): at 21:58

## 2023-05-15 RX ADMIN — Medication 50 MILLIGRAM(S): at 05:22

## 2023-05-15 RX ADMIN — PANTOPRAZOLE SODIUM 40 MILLIGRAM(S): 20 TABLET, DELAYED RELEASE ORAL at 05:22

## 2023-05-15 RX ADMIN — DORZOLAMIDE HYDROCHLORIDE TIMOLOL MALEATE 1 DROP(S): 20; 5 SOLUTION/ DROPS OPHTHALMIC at 05:23

## 2023-05-15 NOTE — PROGRESS NOTE ADULT - SUBJECTIVE AND OBJECTIVE BOX
PGY-1 Progress Note discussed with attending    PAGER #: [[phone]] TILL 5:00 PM  PLEASE CONTACT ON CALL TEAM:  - On Call Team (Please refer to Lashawn) FROM 5:00 PM - 8:30PM  - Nightfloat Team FROM 8:30 -7:30 AM    CC: Patient is a 87y old  Female who presents with a chief complaint of Atrial fibrillation with RVR/ Acute exacerbation of CHF. (14 May 2023 09:35)      OVERNIGHT EVENTS:    SUBJECTIVE / INTERVAL HPI: Patient seen and examined at bedside. No acute complaints. Patient discharge delayed due to [employer] unavailability from [employer] until tomorrow 1pm. Provided with information regarding warfarin and vitamin K foods.     ROS:  CONSTITUTIONAL: No weakness, fevers or chills  EYES/ENT: No visual changes;  No vertigo or throat pain   NECK: No pain or stiffness  RESPIRATORY: No cough, wheezing, hemoptysis; No shortness of breath  CARDIOVASCULAR: No chest pain or palpitations  GASTROINTESTINAL: No abdominal or epigastric pain. No nausea, vomiting, or hematemesis; No diarrhea or constipation. No melena or hematochezia.  GENITOURINARY: No dysuria, frequency or hematuria  NEUROLOGICAL: No numbness or weakness  SKIN: No itching, burning, rashes, or lesions     VITAL SIGNS:  Vital Signs Last 24 Hrs  T(C): 36.9 (15 May 2023 16:05), Max: 37 (15 May 2023 07:05)  T(F): 98.4 (15 May 2023 16:05), Max: 98.6 (15 May 2023 07:05)  HR: 101 (15 May 2023 16:05) (68 - 101)  BP: 111/75 (15 May 2023 16:05) (101/61 - 125/93)  BP(mean): --  RR: 18 (15 May 2023 16:05) (18 - 18)  SpO2: 98% (15 May 2023 16:05) (96% - 100%)    Parameters below as of 15 May 2023 16:05  Patient On (Oxygen Delivery Method): room air        PHYSICAL EXAM:    General: WDWN  HEENT: NC/AT; PERRL, anicteric sclera; MMM  Neck: supple  Cardiovascular: Tachycardic, +S1/S2; irregularly irregular  Respiratory: CTA B/L; no W/R/R  Gastrointestinal: soft, NT/ND; +BSx4  Extremities: WWP; no edema, clubbing or cyanosis  Vascular: 2+ radial, DP/PT pulses B/L  Skin: Warm, dry, good turgor, no rashes, or ecchymoses  Neurological: AAOx3; no focal deficits    MEDICATIONS:  MEDICATIONS  (STANDING):  dorzolamide 2%/timolol 0.5% Ophthalmic Solution 1 Drop(s) Both EYES every 12 hours  levothyroxine 50 MICROGram(s) Oral daily  metoprolol tartrate 50 milliGRAM(s) Oral every 12 hours  pantoprazole    Tablet 40 milliGRAM(s) Oral before breakfast  warfarin 4 milliGRAM(s) Oral once    MEDICATIONS  (PRN):  acetaminophen     Tablet .. 650 milliGRAM(s) Oral every 6 hours PRN Temp greater or equal to 38C (100.4F), Mild Pain (1 - 3)  diazepam    Tablet 10 milliGRAM(s) Oral every 12 hours PRN for anxiety  melatonin 5 milliGRAM(s) Oral at bedtime PRN Insomnia  oxycodone    5 mG/acetaminophen 325 mG 1 Tablet(s) Oral every 6 hours PRN Moderate Pain (4 - 6)      ALLERGIES:  Allergies    Allergy Status Unknown    Intolerances        LABS:                        13.0   6.71  )-----------( 295      ( 15 May 2023 06:10 )             42.1     05-15    141  |  112<H>  |  22<H>  ----------------------------<  85  4.1   |  25  |  0.90    Ca    9.4      15 May 2023 06:10  Phos  3.7     05-15  Mg     2.5     05-15      PT/INR - ( 15 May 2023 06:10 )   PT: 25.4 sec;   INR: 2.12 ratio         PTT - ( 15 May 2023 06:10 )  PTT:37.3 sec    CAPILLARY BLOOD GLUCOSE          RADIOLOGY & ADDITIONAL TESTS: Reviewed.

## 2023-05-15 NOTE — PROGRESS NOTE ADULT - ASSESSMENT
Patient is a 88 y/o who ambulates with a cane with PMH of chronic back pain, hypothyroidism and 'fast racing heart beat' who presented with complain of palpitations and shortness of breath. Patient is being admitted for further management of Atrial fibrillation with RVR .
Patient is a 86 y/o who ambulates with a cane with PMH of chronic back pain, hypothyroidism and 'fast racing heart beat' who presented with complain of palpitations and shortness of breath. Patient is being admitted for further management of Atrial fibrillation with RVR .

## 2023-05-15 NOTE — PROGRESS NOTE ADULT - PROBLEM SELECTOR PLAN 2
C/w home dose of levothyroxine 50mcg.   F/U TSH

## 2023-05-15 NOTE — PROGRESS NOTE ADULT - PROBLEM SELECTOR PLAN 4
Takes 10mg Diazepam 4 times a day PRN outpatient.   Started Diazepam 2 times a day PRN to prevent withdrawal.

## 2023-05-15 NOTE — PROGRESS NOTE ADULT - PROBLEM SELECTOR PROBLEM 3
Detail Level: Generalized
Venous insufficiency
Detail Level: Zone
Detail Level: Detailed
Venous insufficiency

## 2023-05-15 NOTE — PROGRESS NOTE ADULT - REASON FOR ADMISSION
Atrial fibrillation with RVR/ Acute exacerbation of CHF.

## 2023-05-15 NOTE — PROGRESS NOTE ADULT - ATTENDING SUPERVISION STATEMENT
Pt was called and informed of MD note below. She verbalized understanding and has no other questions or concerns at this time. Pt did verbalize that she should not smoke while using the patch.   
Resident

## 2023-05-15 NOTE — PROGRESS NOTE ADULT - PROBLEM SELECTOR PLAN 1
P/w shortness of breath, palpitations, b/l lung crackles.   - Pro-bnp: 1070 (normal limit for age)  - EKG: Atrial fibrillation with RVR.   - Troponin: 19.2.   - Increased Metoprolol 50 BID for rate control.  - CHADSVASC: 3 (age, female).   - started on warfarin (as recommended by cardiology due to lack of insurance)  - C/w Warfarin 6mg at this time. Titrate dose as per INR.   - ECHO: EF 67%, sever LA dilatation, mild PHTN, mod-sever tricuspid regurg, mild pulmonic insufficiency.    - c/w telemetry.  - Cardiology consulted - Dr Rivera.  - Social work consult (no insurance): appointment with [email or web address] on 24th, [facility].  -INR 1.47 on Day 2 of 6mg Warfarin  -INR 2.14 today. Reduce Warfarin to 4mg

## 2023-05-15 NOTE — PROGRESS NOTE ADULT - PROBLEM SELECTOR PLAN 3
varicose veins and bilateral peripheral edema (non pitting).   C/w to monitor.

## 2023-05-15 NOTE — PROGRESS NOTE ADULT - ATTENDING COMMENTS
[place] - Little [id number]  patient denies any cardiac s/s. feels better, HR controlled  will c/w metoprolol at current dose and decrease coumadin to 4 mg po dailu  cbc, bmp, mg , INR, reviewed. check in the morning INR, BMP, CBC, Mg  [facility] medications ordered- will be delivered tomorrow   for out-patient follow up for INR check and coumadin adjustment

## 2023-05-16 VITALS
HEART RATE: 92 BPM | SYSTOLIC BLOOD PRESSURE: 167 MMHG | RESPIRATION RATE: 18 BRPM | OXYGEN SATURATION: 98 % | TEMPERATURE: 98 F | DIASTOLIC BLOOD PRESSURE: 82 MMHG

## 2023-05-16 LAB
ANION GAP SERPL CALC-SCNC: 6 MMOL/L — SIGNIFICANT CHANGE UP (ref 5–17)
APTT BLD: 37.6 SEC — HIGH (ref 27.5–35.5)
BUN SERPL-MCNC: 26 MG/DL — HIGH (ref 7–18)
CALCIUM SERPL-MCNC: 9.2 MG/DL — SIGNIFICANT CHANGE UP (ref 8.4–10.5)
CHLORIDE SERPL-SCNC: 110 MMOL/L — HIGH (ref 96–108)
CO2 SERPL-SCNC: 24 MMOL/L — SIGNIFICANT CHANGE UP (ref 22–31)
CREAT SERPL-MCNC: 0.94 MG/DL — SIGNIFICANT CHANGE UP (ref 0.5–1.3)
EGFR: 59 ML/MIN/1.73M2 — LOW
GLUCOSE SERPL-MCNC: 90 MG/DL — SIGNIFICANT CHANGE UP (ref 70–99)
HCT VFR BLD CALC: 42.9 % — SIGNIFICANT CHANGE UP (ref 34.5–45)
HGB BLD-MCNC: 13 G/DL — SIGNIFICANT CHANGE UP (ref 11.5–15.5)
INR BLD: 2.98 RATIO — HIGH (ref 0.88–1.16)
MCHC RBC-ENTMCNC: 27.5 PG — SIGNIFICANT CHANGE UP (ref 27–34)
MCHC RBC-ENTMCNC: 30.3 GM/DL — LOW (ref 32–36)
MCV RBC AUTO: 90.9 FL — SIGNIFICANT CHANGE UP (ref 80–100)
NRBC # BLD: 0 /100 WBCS — SIGNIFICANT CHANGE UP (ref 0–0)
PLATELET # BLD AUTO: 314 K/UL — SIGNIFICANT CHANGE UP (ref 150–400)
POTASSIUM SERPL-MCNC: 4.2 MMOL/L — SIGNIFICANT CHANGE UP (ref 3.5–5.3)
POTASSIUM SERPL-SCNC: 4.2 MMOL/L — SIGNIFICANT CHANGE UP (ref 3.5–5.3)
PROTHROM AB SERPL-ACNC: 35.9 SEC — HIGH (ref 10.5–13.4)
RBC # BLD: 4.72 M/UL — SIGNIFICANT CHANGE UP (ref 3.8–5.2)
RBC # FLD: 16.6 % — HIGH (ref 10.3–14.5)
SODIUM SERPL-SCNC: 140 MMOL/L — SIGNIFICANT CHANGE UP (ref 135–145)
WBC # BLD: 7.08 K/UL — SIGNIFICANT CHANGE UP (ref 3.8–10.5)
WBC # FLD AUTO: 7.08 K/UL — SIGNIFICANT CHANGE UP (ref 3.8–10.5)

## 2023-05-16 PROCEDURE — 99239 HOSP IP/OBS DSCHRG MGMT >30: CPT | Mod: GC

## 2023-05-16 RX ORDER — ASPIRIN/CALCIUM CARB/MAGNESIUM 324 MG
1 TABLET ORAL
Qty: 30 | Refills: 0
Start: 2023-05-16 | End: 2023-06-14

## 2023-05-16 RX ORDER — METOPROLOL TARTRATE 50 MG
1 TABLET ORAL
Refills: 0 | DISCHARGE

## 2023-05-16 RX ORDER — DIAZEPAM 5 MG
1 TABLET ORAL
Refills: 0 | DISCHARGE

## 2023-05-16 RX ADMIN — Medication 50 MILLIGRAM(S): at 05:43

## 2023-05-16 RX ADMIN — Medication 50 MILLIGRAM(S): at 17:17

## 2023-05-16 RX ADMIN — PANTOPRAZOLE SODIUM 40 MILLIGRAM(S): 20 TABLET, DELAYED RELEASE ORAL at 05:42

## 2023-05-16 RX ADMIN — Medication 50 MICROGRAM(S): at 05:42

## 2023-05-16 RX ADMIN — DORZOLAMIDE HYDROCHLORIDE TIMOLOL MALEATE 1 DROP(S): 20; 5 SOLUTION/ DROPS OPHTHALMIC at 06:26

## 2023-05-16 RX ADMIN — DORZOLAMIDE HYDROCHLORIDE TIMOLOL MALEATE 1 DROP(S): 20; 5 SOLUTION/ DROPS OPHTHALMIC at 17:15

## 2023-05-16 NOTE — PHARMACOTHERAPY INTERVENTION NOTE - COMMENTS
88y/o f was placed on warfarin 4mg for A-fib, on 5/15; prepared for discharge on in it without ability to be monitored (uninsured, lives at the [facility]); due to the safety concern the team decided to change warfarin to ASA 81mg with referral to the [facility]

## 2023-05-22 ENCOUNTER — FORM ENCOUNTER (OUTPATIENT)
Age: 88
End: 2023-05-22

## 2023-05-23 ENCOUNTER — APPOINTMENT (OUTPATIENT)
Dept: CARE COORDINATION | Facility: HOME HEALTH | Age: 88
End: 2023-05-23
Payer: SELF-PAY

## 2023-05-23 DIAGNOSIS — Z78.9 OTHER SPECIFIED HEALTH STATUS: ICD-10-CM

## 2023-05-23 DIAGNOSIS — I48.91 UNSPECIFIED ATRIAL FIBRILLATION: ICD-10-CM

## 2023-05-23 DIAGNOSIS — Z87.39 PERSONAL HISTORY OF OTHER DISEASES OF THE MUSCULOSKELETAL SYSTEM AND CONNECTIVE TISSUE: ICD-10-CM

## 2023-05-23 DIAGNOSIS — Z86.39 PERSONAL HISTORY OF OTHER ENDOCRINE, NUTRITIONAL AND METABOLIC DISEASE: ICD-10-CM

## 2023-05-23 DIAGNOSIS — R60.0 LOCALIZED EDEMA: ICD-10-CM

## 2023-05-23 DIAGNOSIS — Z86.59 PERSONAL HISTORY OF OTHER MENTAL AND BEHAVIORAL DISORDERS: ICD-10-CM

## 2023-05-23 PROBLEM — M54.9 DORSALGIA, UNSPECIFIED: Chronic | Status: ACTIVE | Noted: 2023-05-10

## 2023-05-23 PROBLEM — E03.9 HYPOTHYROIDISM, UNSPECIFIED: Chronic | Status: ACTIVE | Noted: 2023-05-10

## 2023-05-23 PROCEDURE — 99349 HOME/RES VST EST MOD MDM 40: CPT | Mod: 95

## 2023-05-31 PROBLEM — Z86.39 HISTORY OF HYPOTHYROIDISM: Status: RESOLVED | Noted: 2023-05-31 | Resolved: 2023-05-31

## 2023-05-31 PROBLEM — Z87.39 HISTORY OF CHRONIC BACK PAIN: Status: RESOLVED | Noted: 2023-05-31 | Resolved: 2023-05-31

## 2023-05-31 PROBLEM — Z00.00 ENCOUNTER FOR PREVENTIVE HEALTH EXAMINATION: Status: ACTIVE | Noted: 2023-05-31

## 2023-05-31 PROBLEM — Z78.9 DENIES ALCOHOL CONSUMPTION: Status: ACTIVE | Noted: 2023-05-31

## 2023-05-31 PROBLEM — I48.91 ATRIAL FIBRILLATION: Status: ACTIVE | Noted: 2023-05-31

## 2023-05-31 PROBLEM — Z86.59 HISTORY OF ANXIETY: Status: RESOLVED | Noted: 2023-05-31 | Resolved: 2023-05-31

## 2023-05-31 PROBLEM — Z78.9 CURRENT NON-SMOKER: Status: ACTIVE | Noted: 2023-05-31

## 2023-05-31 PROBLEM — Z78.9 DOES NOT USE ILLICIT DRUGS: Status: ACTIVE | Noted: 2023-05-31

## 2023-05-31 RX ORDER — METOPROLOL TARTRATE 50 MG/1
50 TABLET, FILM COATED ORAL
Refills: 0 | Status: ACTIVE | COMMUNITY

## 2023-05-31 RX ORDER — DORZOLAMIDE HYDROCHLORIDE TIMOLOL MALEATE 20; 5 MG/ML; MG/ML
22.3-6.8 SOLUTION/ DROPS OPHTHALMIC
Refills: 0 | Status: ACTIVE | COMMUNITY

## 2023-05-31 RX ORDER — OXYCODONE HYDROCHLORIDE AND ACETAMINOPHEN 5; 325 MG/1; MG/1
5-325 TABLET ORAL
Refills: 0 | Status: ACTIVE | COMMUNITY

## 2023-05-31 RX ORDER — LEVOTHYROXINE SODIUM 0.05 MG/1
50 TABLET ORAL
Refills: 0 | Status: ACTIVE | COMMUNITY

## 2023-05-31 RX ORDER — ASPIRIN 81 MG/1
81 TABLET, COATED ORAL
Refills: 0 | Status: ACTIVE | COMMUNITY

## 2023-06-01 ENCOUNTER — NON-APPOINTMENT (OUTPATIENT)
Age: 88
End: 2023-06-01

## 2023-06-01 PROBLEM — R60.0 LOWER EXTREMITY EDEMA: Status: ACTIVE | Noted: 2023-06-01

## 2023-06-01 RX ORDER — FUROSEMIDE 20 MG/1
20 TABLET ORAL
Refills: 0 | Status: ACTIVE | COMMUNITY

## 2023-06-01 RX ORDER — FUROSEMIDE 20 MG/1
20 TABLET ORAL
Qty: 14 | Refills: 1 | Status: ACTIVE | COMMUNITY
Start: 2023-06-01 | End: 1900-01-01

## 2023-06-01 NOTE — ASSESSMENT
[FreeTextEntry1] : \par "86 y/o who ambulates with a cane with PMH of chronic back pain, hypothyroidism and 'fast racing heart beat' who presented with complain of palpitations and shortness of breath. Patient reports that the she has had these symptoms before however the symptoms were very severe this morning. Patient also reports that she has not been taking her metoprolol which was prescribed for 'fast heart beat' but since she had chest discomfort and palpitations today she took multiple doses today and 'felt' that her blood pressure went too low. Patient denies any chest pain associated with the episode of palpitations. Patient also reports that she has had shortness of \par breath independent of palpitations as well. Patient reports that at times when she is lying down and moves in bed she can develop shortness of breath and has to rest before she feels better. Patient also reports that she develops shortness of breath when lying down at times, and uses 2 pillows to sleep. Patient also says at times she wakes up from sleeping feeling short of breath and she has change her position to be able to sleep after that. She also reports that she gets short of breath with exertion. Patient denies onset of chest pain with exertion. Patient also reports she has never had an MI, stent placement, echocardiogram, stress test. Patient does not follow with a cardiologist due to lack of insurance. Patient denies any recent history of fevers, chills, headache, lightheadedness, dizziness, chest pain, cough, nausea, vomiting, abdominal pain, diarrhea, constipation or urinary symptoms. Admitted to telemetry for Afib with RVR. Dr. Rivera cardiology consulted who recommended starting metoprolol 12.5mg and warfarin due to lack of insurance. Monitored on telemetry and rate noted to be uncontrolled. Eventually increased dose to 25mg BID and then ulitmately to 50mg BID  with good control. Patient was started on warfarin for AC. Patients INR monitored and dose titrated to therapeutic levels prior to discharge. Case management and social work consulted for lack of insurance and PCP. Vivo meds ordered. Patient initially instructed to follow up for INR monitoring at [facility] and scheduled appointment at [facility] however access to transportation and patient's home location made required follow up unfeasible. Due to limitations with follow up for routine INR monitoring on warfarin Dr. Rivera recommended discharge only on ASA 81mg daily. Ideally patient will establish care at hospital closer to home. To be discharged on ASA 81mg qd and metoprolol 50mg BID. Patient is stable for discharge and is advised to follow up with PCP as outpatient. " The patient was discharged home in stable condition with home care services and follow up care.\par \par \par AFib: stable\par Continue established treatment plan with follow up\par Continue Metoprolol\par Monitor for worsening  signs and symptoms  and reviewed when to call medical provider\par Pt verbalized good understanding\par AC Coumadin held as per in-patient team\par Follow up with PCP and Cardiologist\par \par Acute on chronic CHF: stable\par Maintain low salt diet, 1.5 fluid restriction\par Check daily weight and maintain log\par Reviewed when to call MD with worsening symptoms\par Follow up with PCP and Cardiology\par \par \par Hypothyroid: stable\par Continue established treatment plan with follow up\par Continue Levothyroxine\par Monitor for worsening  signs and symptoms  and reviewed when to call medical provider\par Pt verbalized good understanding\par Follow up with Medical providers: PCP\par \par Chronic back pain: stable\par Continue established treatment plan with follow up\par Continue Percocet\par Monitor for worsening  signs and symptoms  and reviewed when to call medical provider\par Pt verbalized good understanding\par Follow up with Medical providers: PCP\par \par Pt will need continued Home Care Services RN\par This patient is Enrolled in the [facility] Follow Up Program through [employer]\par Recommended  referral to assist with PCP follow up appointment\par CN reviewed with the pt that pt is under the [facility] program for home care until pt is seen by the PCP.    Reviewed with pt and Home Care RN if symptoms worsen pt will need to call 911 or EMS to be evaluated at local ED.  Pt and Home Care RN verbalized good understanding.\par CN will be assigned to sign Home Care orders post-discharge for continuity of care.\par \par \par \par \par

## 2023-06-01 NOTE — REVIEW OF SYSTEMS
[Negative] : Heme/Lymph [FreeTextEntry5] : + LE edema [FreeTextEntry6] : + intermittent SOB with exertion

## 2023-06-01 NOTE — HISTORY OF PRESENT ILLNESS
[Home] : at home, [unfilled] , at the time of the visit. [Other Location: e.g. Home (Enter Location, City,State)___] : at [unfilled] [Other:____] : [unfilled] [Verbal consent obtained from patient] : the patient, [unfilled] [FreeTextEntry1] : Follow up post discharge s/p recent hospitalization\par  [de-identified] : This patient is Enrolled in the Post-Discharge [facility] Follow Up Program through [facility] for Continuity of Care S/P Recent Hospitalization until seen by PCP.\par Copied As per [facility] Discharge Summary\par \par "88 y/o who ambulates with a cane with PMH of chronic back pain, hypothyroidism and 'fast racing heart beat' who presented with complain of palpitations and shortness of breath. Patient reports that the she has had these symptoms before however the symptoms were very severe this morning. Patient also reports that she has not been taking her metoprolol which was prescribed for 'fast heart beat' but since she had chest discomfort and palpitations today she took multiple doses today and 'felt' that her blood pressure went too low. Patient denies any chest pain associated with the episode of palpitations. Patient also reports that she has had shortness of \par breath independent of palpitations as well. Patient reports that at times when she is lying down and moves in bed she can develop shortness of breath and has to rest before she feels better. Patient also reports that she develops shortness of breath when lying down at times, and uses 2 pillows to sleep. Patient also says at times she wakes up from sleeping feeling short of breath and she has change her position to be able to sleep after that. She also reports that she gets short of breath with exertion. Patient denies onset of \par chest pain with exertion. Patient also reports she has never had an MI, stent placement, echocardiogram, stress test. Patient does not follow with a cardiologist due to lack of insurance. Patient denies any recent history of fevers, chills, headache, lightheadedness, dizziness, chest pain, cough, nausea, vomiting, abdominal pain, diarrhea, constipation or urinary symptoms. Admitted to telemetry for Afib with RVR. Dr. Rivera cardiology consulted who recommended starting metoprolol 12.5mg and warfarin due to lack of insurance. Monitored on telemetry and rate noted to be uncontrolled. Eventually increased dose to 25mg BID and then ulitmately to 50mg BID  with good control. Patient was started on warfarin for AC. Patients INR monitored and dose titrated to therapeutic levels prior to discharge. Case management and social work consulted for lack of insurance and PCP. Vivo meds ordered. Patient initially instructed to follow up for INR monitoring at [facility] and scheduled appointment at [facility] however access to transportation and patient's home location made required follow up unfeasible. Due to limitations with follow up for routine INR monitoring on warfarin Dr. Rivera recommended discharge only on ASA 81mg daily. Ideally patient will establish care at hospital closer to home. To be discharged on ASA 81mg qd and metoprolol 50mg BID. Patient is stable for discharge and is advised to follow up with PCP as outpatient. " The patient was discharged home in stable condition with home care services and follow up care.\par \par During the TeleHealth the patient was in no acute distress.  Able to speak with complete sentences and no audible wheeze noted.\par The patient denies chest pain, shortness of breath, cough, hemoptysis, fever, palpitations, syncope\par \par CN reviewed that pt  is under the [facility] program for home care until pt is seen by  PCP.   CN will be assigned to sign Home Care orders post-discharge.\par \par Reviewed plan with Home Care RN, pt and Caregiver translated for the pt\par \par Home Care RN Vital Signs\par ZE=474/80, HR=70, RR=18, Temp=97.8

## 2023-06-01 NOTE — PLAN
[FreeTextEntry1] : CV and pulmonary status stable\par Patient, caregiver and Home Care RN advised to continue with medication regimen as directed \par Medication education discussed in full detail with + teach back. \par Encouraged verbalization of fears and concerns. \par Report all symptoms not relieved by rest or medication\par Educated on monitoring blood pressure\par Maintain Balanced diet \par Exercise as appropriate\par Patient educated on s/s of when to call medical providers with + teach back. \par Reminded of NP role and advised to call with any questions/concerns. \par Follow up with medical providers as scheduled\par Instructed the patient to call TCM Team or CCC with any questions or concerns.\par

## 2023-06-01 NOTE — PHYSICAL EXAM
[No Acute Distress] : no acute distress [Well Nourished] : well nourished [Normal Sclera/Conjunctiva] : normal sclera/conjunctiva [Normal Outer Ear/Nose] : the outer ears and nose were normal in appearance [No Respiratory Distress] : no respiratory distress  [de-identified] : Limited Visual Physical Exam during TeleHealth Visit [de-identified] : Awake and alert [de-identified] : Calm

## 2023-09-20 NOTE — ED PROVIDER NOTE - LANGUAGE ASSISTANCE NEEDED
Patient arrives via EMS from [facility] after syncopizing. Denies LOC. Reports she is on blood thinners but unsure which ones. Denies neck or back pain. Alert and oriented x4.  Reports this had happened in the past.
Yes-Patient/Caregiver accepts free interpretation services...

## 2023-10-08 PROCEDURE — 84443 ASSAY THYROID STIM HORMONE: CPT

## 2023-10-08 PROCEDURE — 85730 THROMBOPLASTIN TIME PARTIAL: CPT

## 2023-10-08 PROCEDURE — 85027 COMPLETE CBC AUTOMATED: CPT

## 2023-10-08 PROCEDURE — 84100 ASSAY OF PHOSPHORUS: CPT

## 2023-10-08 PROCEDURE — 85025 COMPLETE CBC W/AUTO DIFF WBC: CPT

## 2023-10-08 PROCEDURE — 83880 ASSAY OF NATRIURETIC PEPTIDE: CPT

## 2023-10-08 PROCEDURE — 84484 ASSAY OF TROPONIN QUANT: CPT

## 2023-10-08 PROCEDURE — 80048 BASIC METABOLIC PNL TOTAL CA: CPT

## 2023-10-08 PROCEDURE — 71046 X-RAY EXAM CHEST 2 VIEWS: CPT

## 2023-10-08 PROCEDURE — 81001 URINALYSIS AUTO W/SCOPE: CPT

## 2023-10-08 PROCEDURE — 97162 PT EVAL MOD COMPLEX 30 MIN: CPT

## 2023-10-08 PROCEDURE — 36415 COLL VENOUS BLD VENIPUNCTURE: CPT

## 2023-10-08 PROCEDURE — 80053 COMPREHEN METABOLIC PANEL: CPT

## 2023-10-08 PROCEDURE — 83735 ASSAY OF MAGNESIUM: CPT

## 2023-10-08 PROCEDURE — 99285 EMERGENCY DEPT VISIT HI MDM: CPT | Mod: 25

## 2023-10-08 PROCEDURE — 85610 PROTHROMBIN TIME: CPT

## 2023-10-08 PROCEDURE — 93306 TTE W/DOPPLER COMPLETE: CPT

## 2023-10-08 PROCEDURE — 80061 LIPID PANEL: CPT

## 2023-10-08 PROCEDURE — 83036 HEMOGLOBIN GLYCOSYLATED A1C: CPT

## 2023-10-08 PROCEDURE — 87086 URINE CULTURE/COLONY COUNT: CPT

## 2023-11-07 NOTE — PHYSICAL THERAPY INITIAL EVALUATION ADULT - LEVEL OF INDEPENDENCE: SCOOT/BRIDGE, REHAB EVAL
Triage informed Ervin Mccoy of Dr. Cely Montenegro response. He verbalizes understanding and agrees to plan. independent

## 2024-02-09 NOTE — DISCHARGE NOTE PROVIDER - NSDCHHATTENDCERT_GEN_ALL_CORE
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 After angiography, AC/AP per NSCU.  [ ] BP goals per NSCU  [ ] Atorvastatin 40-80 mg daily titrated per LDL < 70  [ ] HgbA1C, fasting lipid panel, CBC, CMP, coag panel, troponin  [ ] MRI Brain w/o contrast to evaluate for infarction  [ ] TTE   [ ] telemetry to check for arrhythmia, EKG, will discuss loop recorder    - Dysphagia screen. If fails, speech/swallow eval  - aspiration, fall precautions  - STAT CT head non-contrast for change in neuro exam.   - PT/ OT / DVT ppx per primary team     Discussed with stroke fellow Dr. Ej Bar and under supervision of  Dr. Smith   My signature below certifies that the above stated patient is homebound and upon completion of the Face-To-Face encounter, has the need for intermittent skilled nursing, physical therapy and/or speech or occupational therapy services in their home for their current diagnosis as outlined in their initial plan of care. These services will continue to be monitored by myself or another physician. 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 Now s/p angiography per NSCU    Recommendations:  [ ] Admit to NSCU  [ ] q1h vitals and neurochecks  [ ] Prior to angiography, recommend hep gtt PTT goal 50-90. After angiography, AC/AP per NSCU.  [ ] BP goals per NSCU  [ ] Atorvastatin 40-80 mg daily titrated per LDL < 70  [ ] HgbA1C, fasting lipid panel, CBC, CMP, coag panel, troponin  [ ] MRI Brain w/o contrast to evaluate for infarction  [ ] TTE   [ ] telemetry to check for arrhythmia, EKG, will discuss loop recorder    - Dysphagia screen. If fails, speech/swallow eval  - aspiration, fall precautions  - STAT CT head non-contrast for change in neuro exam.   - PT/ OT / DVT ppx per primary team     Discussed with stroke fellow Dr. Ej Bar and under supervision of  Dr. Smith   LKW 2/7 (exact time unknown)  preMRS 0  NIHSS 4 initially at [facility] (see code stroke note)  Pt not IV tenecteplase candidate because outside of time window  Pt not thrombectomy candidate because outside of time window      Impresssion: Acute onset gait instability, limb dysmetria LLE weakness, dysarthria. 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 Now s/p angiography per NSCU    Recommendations:  [ ] Admit to NSCU  [ ] q1h vitals and neurochecks  [ ] Prior to angiography, recommend hep gtt PTT goal 50-90. After angiography, AC/AP per NSCU.  [ ] BP goals per NSCU  [ ] Atorvastatin 40-80 mg daily titrated per LDL < 70  [ ] HgbA1C, fasting lipid panel, CBC, CMP, coag panel, troponin  [ ] MRI Brain w/o contrast to evaluate for infarction  [ ] TTE   [ ] telemetry to check for arrhythmia, EKG, will discuss loop recorder    - Dysphagia screen. If fails, speech/swallow eval  - aspiration, fall precautions  - STAT CT head non-contrast for change in neuro exam.   - PT/ OT / DVT ppx per primary team     Discussed with stroke fellow Dr. Ej Bar and under supervision of  Dr. Smith   JUAN FW 2/7 (exact time unknown)  preMRS 0  NIHSS 4 initially at [facility] (see code stroke note)  Pt not IV tenecteplase candidate because outside of time window  Pt not thrombectomy candidate because outside of time window    Impresssion: Acute onset gait instability, limb dysmetria LLE weakness, dysarthria. 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 Now s/p angiography without stent placement (demonstrated non-flow limiting stenosis)    Recommendations:  [ ] Admit to NSCU  [ ] q1h vitals and neurochecks  [ ] Prior to angiography, recommend hep gtt PTT goal 50-90. After angiography, AC/AP per NSCU.  [ ] BP goals per NSCU  [ ] Atorvastatin 40-80 mg daily titrated per LDL < 70  [ ] HgbA1C, fasting lipid panel, CBC, CMP, coag panel, troponin  [ ] MRI Brain w/o contrast to evaluate for infarction  [ ] TTE   [ ] telemetry to check for arrhythmia, EKG, will discuss loop recorder    - Dysphagia screen. If fails, speech/swallow eval  - aspiration, fall precautions  - STAT CT head non-contrast for change in neuro exam.   - PT/ OT / DVT ppx per primary team     Discussed with stroke fellow Dr. Ej Bar and under supervision of  Dr. Smith

## 2025-03-26 NOTE — PATIENT PROFILE ADULT - LANGUAGE ASSISTANCE NEEDED
HR=65 bpm, GNAS=620/67 mmhg, SpO2=99.0 %, Resp=20 B/min, EtCO2=34 mmHg, Apnea=3 Seconds, Pain=0, Florencio=3 No-Patient/Caregiver offered and refused free interpretation services.